# Patient Record
Sex: FEMALE | Race: OTHER | Employment: FULL TIME | URBAN - METROPOLITAN AREA
[De-identification: names, ages, dates, MRNs, and addresses within clinical notes are randomized per-mention and may not be internally consistent; named-entity substitution may affect disease eponyms.]

---

## 2017-02-14 ENCOUNTER — APPOINTMENT (OUTPATIENT)
Dept: LAB | Age: 57
End: 2017-02-14
Attending: INTERNAL MEDICINE
Payer: COMMERCIAL

## 2017-02-14 ENCOUNTER — TELEPHONE (OUTPATIENT)
Dept: NEPHROLOGY | Facility: CLINIC | Age: 57
End: 2017-02-14

## 2017-02-14 DIAGNOSIS — Z94.0 KIDNEY TRANSPLANT RECIPIENT: ICD-10-CM

## 2017-02-14 DIAGNOSIS — E11.9 TYPE 2 DIABETES MELLITUS WITHOUT COMPLICATION, WITHOUT LONG-TERM CURRENT USE OF INSULIN (HCC): ICD-10-CM

## 2017-02-14 DIAGNOSIS — E78.00 PURE HYPERCHOLESTEROLEMIA: ICD-10-CM

## 2017-02-14 DIAGNOSIS — N18.4 CKD (CHRONIC KIDNEY DISEASE), STAGE 4 (SEVERE): Primary | ICD-10-CM

## 2017-02-14 LAB
ALBUMIN SERPL-MCNC: 3.5 G/DL (ref 3.5–4.8)
ALP LIVER SERPL-CCNC: 90 U/L (ref 46–118)
ALT SERPL-CCNC: 20 U/L (ref 14–54)
AST SERPL-CCNC: 21 U/L (ref 15–41)
BILIRUB SERPL-MCNC: 0.5 MG/DL (ref 0.1–2)
BUN BLD-MCNC: 43 MG/DL (ref 8–20)
CALCIUM BLD-MCNC: 8.7 MG/DL (ref 8.3–10.3)
CHLORIDE: 102 MMOL/L (ref 101–111)
CHOLEST SMN-MCNC: 139 MG/DL (ref ?–200)
CO2: 26 MMOL/L (ref 22–32)
CREAT BLD-MCNC: 2.09 MG/DL (ref 0.55–1.02)
CREAT UR-SCNC: 52.4 MG/DL
GLUCOSE BLD-MCNC: 95 MG/DL (ref 70–99)
HDLC SERPL-MCNC: 67 MG/DL (ref 45–?)
HDLC SERPL: 2.07 {RATIO} (ref ?–4.44)
LDLC SERPL CALC-MCNC: 40 MG/DL (ref ?–130)
M PROTEIN MFR SERPL ELPH: 7 G/DL (ref 6.1–8.3)
MICROALBUMIN UR-MCNC: 2.89 MG/DL
MICROALBUMIN/CREAT 24H UR-RTO: 55.2 UG/MG (ref ?–30)
NONHDLC SERPL-MCNC: 72 MG/DL (ref ?–130)
POTASSIUM SERPL-SCNC: 4 MMOL/L (ref 3.6–5.1)
SODIUM SERPL-SCNC: 135 MMOL/L (ref 136–144)
TRIGLYCERIDES: 159 MG/DL (ref ?–150)
VLDL: 32 MG/DL (ref 5–40)

## 2017-02-14 PROCEDURE — 82043 UR ALBUMIN QUANTITATIVE: CPT

## 2017-02-14 PROCEDURE — 36415 COLL VENOUS BLD VENIPUNCTURE: CPT

## 2017-02-14 PROCEDURE — 82570 ASSAY OF URINE CREATININE: CPT

## 2017-02-14 PROCEDURE — 80053 COMPREHEN METABOLIC PANEL: CPT

## 2017-02-14 PROCEDURE — 80061 LIPID PANEL: CPT

## 2017-02-16 NOTE — TELEPHONE ENCOUNTER
Left 2 messages with pt. Asked pt to get labs quarterly- I entered standing order including prograf level.  Current labs / Cr stable- thx fang

## 2017-04-10 ENCOUNTER — LAB ENCOUNTER (OUTPATIENT)
Dept: LAB | Age: 57
End: 2017-04-10
Attending: INTERNAL MEDICINE
Payer: COMMERCIAL

## 2017-04-10 DIAGNOSIS — Z94.0 KIDNEY TRANSPLANT RECIPIENT: ICD-10-CM

## 2017-04-10 DIAGNOSIS — N18.4 CKD (CHRONIC KIDNEY DISEASE), STAGE 4 (SEVERE): ICD-10-CM

## 2017-04-10 PROCEDURE — 80197 ASSAY OF TACROLIMUS: CPT

## 2017-04-10 PROCEDURE — 85025 COMPLETE CBC W/AUTO DIFF WBC: CPT

## 2017-04-10 PROCEDURE — 36415 COLL VENOUS BLD VENIPUNCTURE: CPT

## 2017-04-10 PROCEDURE — 80048 BASIC METABOLIC PNL TOTAL CA: CPT

## 2017-04-13 ENCOUNTER — TELEPHONE (OUTPATIENT)
Dept: NEPHROLOGY | Facility: CLINIC | Age: 57
End: 2017-04-13

## 2017-04-16 NOTE — TELEPHONE ENCOUNTER
Left message for pt- prograf level a bit high but unclear if this is a true 12 hr trough- asked pt to call office back to clarify timing of dose / lab draw. Renal function, etc all stable.

## 2017-04-20 ENCOUNTER — TELEPHONE (OUTPATIENT)
Dept: NEPHROLOGY | Facility: CLINIC | Age: 57
End: 2017-04-20

## 2017-04-21 NOTE — TELEPHONE ENCOUNTER
Left VM with pt- all labs stable; prograf level a bit high (target 5-8) but this may not be a true trough- asked pt to repeat next month- martin ramirez

## 2017-05-05 PROBLEM — M62.81 MUSCLE WEAKNESS: Status: ACTIVE | Noted: 2017-05-05

## 2017-05-05 PROBLEM — N39.41 URGE URINARY INCONTINENCE: Status: ACTIVE | Noted: 2017-05-05

## 2017-05-05 PROCEDURE — 87015 SPECIMEN INFECT AGNT CONCNTJ: CPT | Performed by: INTERNAL MEDICINE

## 2017-05-05 PROCEDURE — 87045 FECES CULTURE AEROBIC BACT: CPT | Performed by: INTERNAL MEDICINE

## 2017-05-05 PROCEDURE — 87427 SHIGA-LIKE TOXIN AG IA: CPT | Performed by: INTERNAL MEDICINE

## 2017-05-05 PROCEDURE — 87046 STOOL CULTR AEROBIC BACT EA: CPT | Performed by: INTERNAL MEDICINE

## 2017-05-05 PROCEDURE — 87493 C DIFF AMPLIFIED PROBE: CPT | Performed by: INTERNAL MEDICINE

## 2017-10-06 ENCOUNTER — LAB ENCOUNTER (OUTPATIENT)
Dept: LAB | Age: 57
End: 2017-10-06
Attending: INTERNAL MEDICINE
Payer: COMMERCIAL

## 2017-10-06 ENCOUNTER — TELEPHONE (OUTPATIENT)
Dept: NEPHROLOGY | Facility: CLINIC | Age: 57
End: 2017-10-06

## 2017-10-06 DIAGNOSIS — Z94.0 KIDNEY TRANSPLANT RECIPIENT: ICD-10-CM

## 2017-10-06 DIAGNOSIS — N18.4 STAGE 4 CHRONIC KIDNEY DISEASE (HCC): ICD-10-CM

## 2017-10-06 PROCEDURE — 85025 COMPLETE CBC W/AUTO DIFF WBC: CPT

## 2017-10-06 PROCEDURE — 36415 COLL VENOUS BLD VENIPUNCTURE: CPT

## 2017-10-06 PROCEDURE — 80197 ASSAY OF TACROLIMUS: CPT

## 2017-10-06 PROCEDURE — 80048 BASIC METABOLIC PNL TOTAL CA: CPT

## 2017-10-07 NOTE — TELEPHONE ENCOUNTER
Left VM for pt- all labs stable; prograf level pending; asked pt to increase frequency of lab draws to at least q3 months- martin ramirez

## 2017-10-09 ENCOUNTER — TELEPHONE (OUTPATIENT)
Dept: NEPHROLOGY | Facility: CLINIC | Age: 57
End: 2017-10-09

## 2017-10-10 ENCOUNTER — TELEPHONE (OUTPATIENT)
Dept: NEPHROLOGY | Facility: CLINIC | Age: 57
End: 2017-10-10

## 2017-10-10 NOTE — TELEPHONE ENCOUNTER
Left VM x 2- renal function / routine labs stable; prograf level therapeutic; asked pt to have labs drawn q3 months- martin ramirez

## 2017-10-11 NOTE — TELEPHONE ENCOUNTER
Left VM x 3 with pt- all labs stable- asked pt to increase lab draws to at least q3 months- martin ramirez

## 2017-12-28 PROBLEM — E11.9 TYPE 2 DIABETES MELLITUS WITHOUT COMPLICATION, WITHOUT LONG-TERM CURRENT USE OF INSULIN (HCC): Status: ACTIVE | Noted: 2017-12-28

## 2018-01-19 ENCOUNTER — LAB ENCOUNTER (OUTPATIENT)
Dept: LAB | Age: 58
End: 2018-01-19
Attending: INTERNAL MEDICINE
Payer: COMMERCIAL

## 2018-01-19 ENCOUNTER — OFFICE VISIT (OUTPATIENT)
Dept: NEPHROLOGY | Facility: CLINIC | Age: 58
End: 2018-01-19

## 2018-01-19 DIAGNOSIS — E11.9 DIABETES MELLITUS TYPE 2 IN NONOBESE (HCC): ICD-10-CM

## 2018-01-19 DIAGNOSIS — Z94.0 KIDNEY TRANSPLANT RECIPIENT: Primary | ICD-10-CM

## 2018-01-19 DIAGNOSIS — N18.4 STAGE 4 CHRONIC KIDNEY DISEASE (HCC): ICD-10-CM

## 2018-01-19 DIAGNOSIS — Z94.0 KIDNEY TRANSPLANT RECIPIENT: ICD-10-CM

## 2018-01-19 LAB
BASOPHILS # BLD AUTO: 0.03 X10(3) UL (ref 0–0.1)
BASOPHILS NFR BLD AUTO: 1.2 %
BUN BLD-MCNC: 32 MG/DL (ref 8–20)
CALCIUM BLD-MCNC: 9.2 MG/DL (ref 8.3–10.3)
CHLORIDE: 107 MMOL/L (ref 101–111)
CO2: 27 MMOL/L (ref 22–32)
CREAT BLD-MCNC: 2.34 MG/DL (ref 0.55–1.02)
EOSINOPHIL # BLD AUTO: 0.06 X10(3) UL (ref 0–0.3)
EOSINOPHIL NFR BLD AUTO: 2.3 %
ERYTHROCYTE [DISTWIDTH] IN BLOOD BY AUTOMATED COUNT: 14.3 % (ref 11.5–16)
GLUCOSE BLD-MCNC: 200 MG/DL (ref 70–99)
HCT VFR BLD AUTO: 34.7 % (ref 34–50)
HGB BLD-MCNC: 11.8 G/DL (ref 12–16)
IMMATURE GRANULOCYTE COUNT: 0.01 X10(3) UL (ref 0–1)
IMMATURE GRANULOCYTE RATIO %: 0.4 %
LYMPHOCYTES # BLD AUTO: 1.1 X10(3) UL (ref 0.9–4)
LYMPHOCYTES NFR BLD AUTO: 42.5 %
MCH RBC QN AUTO: 33.9 PG (ref 27–33.2)
MCHC RBC AUTO-ENTMCNC: 34 G/DL (ref 31–37)
MCV RBC AUTO: 99.7 FL (ref 81–100)
MONOCYTES # BLD AUTO: 0.36 X10(3) UL (ref 0.1–0.6)
MONOCYTES NFR BLD AUTO: 13.9 %
NEUTROPHIL ABS PRELIM: 1.03 X10 (3) UL (ref 1.3–6.7)
NEUTROPHILS # BLD AUTO: 1.03 X10(3) UL (ref 1.3–6.7)
NEUTROPHILS NFR BLD AUTO: 39.7 %
PLATELET # BLD AUTO: 319 10(3)UL (ref 150–450)
POTASSIUM SERPL-SCNC: 4.2 MMOL/L (ref 3.6–5.1)
RBC # BLD AUTO: 3.48 X10(6)UL (ref 3.8–5.1)
RED CELL DISTRIBUTION WIDTH-SD: 51.9 FL (ref 35.1–46.3)
SODIUM SERPL-SCNC: 141 MMOL/L (ref 136–144)
WBC # BLD AUTO: 2.6 X10(3) UL (ref 4–13)

## 2018-01-19 PROCEDURE — 80197 ASSAY OF TACROLIMUS: CPT

## 2018-01-19 PROCEDURE — 36415 COLL VENOUS BLD VENIPUNCTURE: CPT

## 2018-01-19 PROCEDURE — 80048 BASIC METABOLIC PNL TOTAL CA: CPT

## 2018-01-19 PROCEDURE — 99214 OFFICE O/P EST MOD 30 MIN: CPT | Performed by: INTERNAL MEDICINE

## 2018-01-19 PROCEDURE — 85025 COMPLETE CBC W/AUTO DIFF WBC: CPT

## 2018-01-19 NOTE — PROGRESS NOTES
Nephrology Progress Note      ASSESSMENT/PLAN:        1) CKD 4 s/p renal transplant approx 15 yrs ago (SLE)- doing very well with stable Cr approx 2.2 mg/dl over the last 5 years; this is plateaued since she developed acute rejection after steroid withdraw prednisone   • HYPERTENSION    • Kidney replaced by transplant [id number]    DR. BANKS([place]),   • Lupus erythematosus    • Ovarian cyst    • PONV (postoperative nausea and vomiting)    • RENAL DISEASE     kidney transplant      Past Surgical Histo Solution Pen-injector INJECT 20 UNITS SUBCUTANEOUSLY ONCE DAILY Disp: 15 mL Rfl: 0   azathioprine 50 MG Oral Tab Take 1 tablet (50 mg total) by mouth once daily.  Disp: 90 tablet Rfl: 3   tacrolimus 1 MG Oral Cap TAKE 2 CAPSULES IN THE MORNING AND IN THE EV

## 2018-01-20 LAB — TACROLIMUS: 10.1 NG/ML

## 2018-02-21 PROBLEM — Z94.0 RENAL TRANSPLANT RECIPIENT (HCC): Status: ACTIVE | Noted: 2018-02-21

## 2018-02-21 PROBLEM — N18.4 CHRONIC KIDNEY DISEASE (CKD), STAGE IV (SEVERE) (HCC): Status: ACTIVE | Noted: 2018-02-21

## 2018-02-21 PROBLEM — Z94.0 RENAL TRANSPLANT RECIPIENT: Status: ACTIVE | Noted: 2018-02-21

## 2018-03-07 PROBLEM — M62.81 MUSCLE WEAKNESS: Status: RESOLVED | Noted: 2017-05-05 | Resolved: 2018-03-07

## 2018-03-07 PROBLEM — E11.9 TYPE 2 DIABETES MELLITUS WITHOUT COMPLICATION, WITHOUT LONG-TERM CURRENT USE OF INSULIN (HCC): Status: RESOLVED | Noted: 2017-12-28 | Resolved: 2018-03-07

## 2018-03-07 PROCEDURE — 88175 CYTOPATH C/V AUTO FLUID REDO: CPT | Performed by: INTERNAL MEDICINE

## 2018-03-07 PROCEDURE — 87625 HPV TYPES 16 & 18 ONLY: CPT | Performed by: INTERNAL MEDICINE

## 2018-03-07 PROCEDURE — 87624 HPV HI-RISK TYP POOLED RSLT: CPT | Performed by: INTERNAL MEDICINE

## 2018-05-31 ENCOUNTER — HOSPITAL ENCOUNTER (OUTPATIENT)
Facility: HOSPITAL | Age: 58
Setting detail: OBSERVATION
LOS: 1 days | Discharge: HOME | End: 2018-06-01
Attending: INTERNAL MEDICINE | Admitting: INTERNAL MEDICINE
Payer: MEDICAID

## 2018-05-31 DIAGNOSIS — D68.9 COAGULOPATHY (HCC): ICD-10-CM

## 2018-05-31 DIAGNOSIS — I10 ESSENTIAL HYPERTENSION: ICD-10-CM

## 2018-05-31 DIAGNOSIS — Z95.5 S/P CORONARY ARTERY STENT PLACEMENT: Primary | ICD-10-CM

## 2018-05-31 DIAGNOSIS — I48.0 PAROXYSMAL ATRIAL FIBRILLATION (HCC): ICD-10-CM

## 2018-05-31 DIAGNOSIS — N18.4 CHRONIC KIDNEY DISEASE (CKD), STAGE IV (SEVERE) (HCC): ICD-10-CM

## 2018-05-31 DIAGNOSIS — I25.10 ATHEROSCLEROSIS OF NATIVE CORONARY ARTERY OF NATIVE HEART WITHOUT ANGINA PECTORIS: ICD-10-CM

## 2018-05-31 DIAGNOSIS — E78.00 PURE HYPERCHOLESTEROLEMIA: ICD-10-CM

## 2018-05-31 DIAGNOSIS — Z79.01 LONG TERM (CURRENT) USE OF ANTICOAGULANTS: ICD-10-CM

## 2018-05-31 DIAGNOSIS — E11.9 TYPE 2 DIABETES MELLITUS WITHOUT COMPLICATION, UNSPECIFIED WHETHER LONG TERM INSULIN USE (HCC): ICD-10-CM

## 2018-05-31 PROCEDURE — 93010 ELECTROCARDIOGRAM REPORT: CPT | Performed by: INTERNAL MEDICINE

## 2018-05-31 PROCEDURE — 86920 COMPATIBILITY TEST SPIN: CPT

## 2018-05-31 PROCEDURE — 83036 HEMOGLOBIN GLYCOSYLATED A1C: CPT | Performed by: INTERNAL MEDICINE

## 2018-05-31 PROCEDURE — 85610 PROTHROMBIN TIME: CPT | Performed by: INTERNAL MEDICINE

## 2018-05-31 PROCEDURE — 84484 ASSAY OF TROPONIN QUANT: CPT | Performed by: INTERNAL MEDICINE

## 2018-05-31 PROCEDURE — 82962 GLUCOSE BLOOD TEST: CPT

## 2018-05-31 PROCEDURE — 86901 BLOOD TYPING SEROLOGIC RH(D): CPT | Performed by: INTERNAL MEDICINE

## 2018-05-31 PROCEDURE — 86900 BLOOD TYPING SEROLOGIC ABO: CPT | Performed by: INTERNAL MEDICINE

## 2018-05-31 PROCEDURE — 82550 ASSAY OF CK (CPK): CPT | Performed by: INTERNAL MEDICINE

## 2018-05-31 PROCEDURE — 86850 RBC ANTIBODY SCREEN: CPT | Performed by: INTERNAL MEDICINE

## 2018-05-31 PROCEDURE — 93005 ELECTROCARDIOGRAM TRACING: CPT

## 2018-05-31 PROCEDURE — 85018 HEMOGLOBIN: CPT | Performed by: INTERNAL MEDICINE

## 2018-05-31 RX ORDER — HYDROCODONE BITARTRATE AND ACETAMINOPHEN 5; 325 MG/1; MG/1
1-2 TABLET ORAL EVERY 4 HOURS PRN
Status: DISCONTINUED | OUTPATIENT
Start: 2018-05-31 | End: 2018-06-01

## 2018-05-31 RX ORDER — DEXTROSE MONOHYDRATE 25 G/50ML
50 INJECTION, SOLUTION INTRAVENOUS
Status: DISCONTINUED | OUTPATIENT
Start: 2018-05-31 | End: 2018-06-01

## 2018-05-31 RX ORDER — ATORVASTATIN CALCIUM 10 MG/1
10 TABLET, FILM COATED ORAL NIGHTLY
Status: DISCONTINUED | OUTPATIENT
Start: 2018-05-31 | End: 2018-06-01

## 2018-05-31 RX ORDER — PREDNISONE 1 MG/1
5 TABLET ORAL
Status: DISCONTINUED | OUTPATIENT
Start: 2018-05-31 | End: 2018-06-01

## 2018-05-31 RX ORDER — TACROLIMUS 1 MG/1
2 CAPSULE ORAL 2 TIMES DAILY
Status: DISCONTINUED | OUTPATIENT
Start: 2018-05-31 | End: 2018-06-01

## 2018-05-31 RX ORDER — HYDROCODONE BITARTRATE AND ACETAMINOPHEN 5; 325 MG/1; MG/1
1-2 TABLET ORAL EVERY 4 HOURS PRN
COMMUNITY
End: 2018-06-12 | Stop reason: ALTCHOICE

## 2018-05-31 RX ORDER — AZATHIOPRINE 50 MG/1
50 TABLET ORAL DAILY
Status: DISCONTINUED | OUTPATIENT
Start: 2018-05-31 | End: 2018-06-01

## 2018-05-31 RX ORDER — SODIUM CHLORIDE 9 MG/ML
INJECTION, SOLUTION INTRAVENOUS ONCE
Status: COMPLETED | OUTPATIENT
Start: 2018-05-31 | End: 2018-06-01

## 2018-05-31 RX ORDER — ACETAMINOPHEN 325 MG/1
650 TABLET ORAL EVERY 6 HOURS PRN
Status: DISCONTINUED | OUTPATIENT
Start: 2018-05-31 | End: 2018-06-01

## 2018-05-31 RX ORDER — AMLODIPINE BESYLATE 5 MG/1
10 TABLET ORAL DAILY
Status: DISCONTINUED | OUTPATIENT
Start: 2018-05-31 | End: 2018-06-01

## 2018-05-31 RX ORDER — ACETAMINOPHEN 325 MG/1
650 TABLET ORAL ONCE
Status: COMPLETED | OUTPATIENT
Start: 2018-05-31 | End: 2018-05-31

## 2018-05-31 NOTE — PROGRESS NOTES
NURSING ADMISSION NOTE      Patient admitted via Ambulance  Oriented to room. Safety precautions initiated. Bed in low position. Call light in reach. Direct admit from [facility] Patient c/o moderate pain on oral incision.  Patient's vital

## 2018-05-31 NOTE — PLAN OF CARE
Patient c/o mild dizziness, BP 25'U-088'E systolic. No active bleeding noted from oral incision. Tele shows sinus rhythm/ sinus tachycardia. Dr. Ballesteros Dose notified regarding elevated troponin level and hemoglobin 5.0.

## 2018-05-31 NOTE — CONSULTS
[facility] [address] [place] [address] Cardiology  Report of Consultation    Viviana Norman Patient Status:  Inpatient    10/2/1960 MRN [id number]   [facility] 4NW-A Attending Gloria Marti MD   Hosp Day # 0 PCP Teofilo Pierre MD     Reason for C LAD-Bo  1/12/10: ANGIOPLASTY (CORONARY)      Comment: Xience Stent to Ramus-Edward  No date: AV FISTULA REVISION, OPEN  10/26/2015: COLONOSCOPY,DIAGNOSTIC N/A      Comment: Procedure: COLONOSCOPY, POSSIBLE BIOPSY,                POSSIBLE POLYPECTOMY 45 Rfl: 3   TACROLIMUS 1 MG Oral Cap TAKE 2 CAPSULES IN THE MORNING AND EVENING Disp: 360 capsule Rfl: 1   simvastatin 20 MG Oral Tab TAKE 1 TABLET NIGHTLY Disp: 90 tablet Rfl: 4   Insulin Pen Needle (BD PEN NEEDLE MINI U/F) 31G X 5 MM Does not apply Misc bid impulse. Lungs: Clear to ascultation bilaterally. No focal rales, rhonchi, or wheezes. Good air movement is noted throughout all lung fields. Abdomen: Soft. Non-distended. Non-tender. Bowel sounds are present and normoactive.   No guarding or rebound       Gwendolyn Ellison MD  5/31/2018  6:27 PM

## 2018-06-01 ENCOUNTER — APPOINTMENT (OUTPATIENT)
Dept: CV DIAGNOSTICS | Facility: HOSPITAL | Age: 58
End: 2018-06-01
Attending: INTERNAL MEDICINE
Payer: MEDICAID

## 2018-06-01 VITALS
WEIGHT: 149.88 LBS | HEART RATE: 94 BPM | RESPIRATION RATE: 16 BRPM | BODY MASS INDEX: 29.42 KG/M2 | SYSTOLIC BLOOD PRESSURE: 148 MMHG | OXYGEN SATURATION: 100 % | TEMPERATURE: 98 F | HEIGHT: 60 IN | DIASTOLIC BLOOD PRESSURE: 71 MMHG

## 2018-06-01 PROBLEM — D68.9 COAGULOPATHY (HCC): Status: ACTIVE | Noted: 2018-06-01

## 2018-06-01 PROCEDURE — 30233N1 TRANSFUSION OF NONAUTOLOGOUS RED BLOOD CELLS INTO PERIPHERAL VEIN, PERCUTANEOUS APPROACH: ICD-10-PCS | Performed by: INTERNAL MEDICINE

## 2018-06-01 PROCEDURE — 85610 PROTHROMBIN TIME: CPT | Performed by: INTERNAL MEDICINE

## 2018-06-01 PROCEDURE — 80048 BASIC METABOLIC PNL TOTAL CA: CPT | Performed by: NURSE PRACTITIONER

## 2018-06-01 PROCEDURE — 84484 ASSAY OF TROPONIN QUANT: CPT | Performed by: INTERNAL MEDICINE

## 2018-06-01 PROCEDURE — 97116 GAIT TRAINING THERAPY: CPT

## 2018-06-01 PROCEDURE — 93306 TTE W/DOPPLER COMPLETE: CPT | Performed by: INTERNAL MEDICINE

## 2018-06-01 PROCEDURE — 36430 TRANSFUSION BLD/BLD COMPNT: CPT

## 2018-06-01 PROCEDURE — 97161 PT EVAL LOW COMPLEX 20 MIN: CPT

## 2018-06-01 PROCEDURE — 82962 GLUCOSE BLOOD TEST: CPT

## 2018-06-01 PROCEDURE — 82550 ASSAY OF CK (CPK): CPT | Performed by: INTERNAL MEDICINE

## 2018-06-01 PROCEDURE — 85025 COMPLETE CBC W/AUTO DIFF WBC: CPT | Performed by: NURSE PRACTITIONER

## 2018-06-01 PROCEDURE — 85025 COMPLETE CBC W/AUTO DIFF WBC: CPT | Performed by: INTERNAL MEDICINE

## 2018-06-01 RX ORDER — WARFARIN SODIUM 2.5 MG/1
TABLET ORAL
Qty: 215 TABLET | Refills: 3 | Status: SHIPPED | OUTPATIENT
Start: 2018-06-01 | End: 2018-06-01

## 2018-06-01 RX ORDER — WARFARIN SODIUM 2.5 MG/1
TABLET ORAL
Qty: 215 TABLET | Refills: 3 | Status: SHIPPED | OUTPATIENT
Start: 2018-06-11 | End: 2018-07-24

## 2018-06-01 RX ORDER — WARFARIN SODIUM 2.5 MG/1
2.5 TABLET ORAL NIGHTLY
Qty: 30 TABLET | Refills: 0 | Status: SHIPPED | OUTPATIENT
Start: 2018-06-01 | End: 2018-06-01

## 2018-06-01 NOTE — PROGRESS NOTES
Patient completed 2 units of packed cells this shift. Tolerated without issue. Fall precautions observed. No complaint of pain. Awake at this time,family at bedside.

## 2018-06-01 NOTE — PHYSICAL THERAPY NOTE
PHYSICAL THERAPY EVALUATION - INPATIENT     Room Number: 405/405-A  Evaluation Date: 6/1/2018  Type of Evaluation: Initial  Physician Order: PT Eval and Treat    Presenting Problem: Anemia/syncope  Reason for Therapy: Mobility Dysfunction and Dischar Spouse  Drives: Yes  Patient Owned Equipment: Other (Comment) (comfort ht toilet)  Patient Regularly Uses: Glasses    Prior Level of [place]: Pt is indep c all ADLs/IADLs and amb sans AD; pt recently got laid off with [employer] closing and not currdb wheelchair)?: A Little   -   Need to walk in hospital room?: A Little   -   Climbing 3-5 steps with a railing?: A Little       AM-PAC Score:  Raw Score: 18   PT Approx Degree of Impairment Score: 46.58%   Standardized Score (AM-PAC Scale): 43.63   CMS Livan and overall the evaluation complexity is considered low. These impairments and comorbidities manifest themselves as functional limitations in independent bed mobility, transfers, gait and stairs.   The patient is below baseline and would benefit from skill

## 2018-06-01 NOTE — PROGRESS NOTES
Assumed care at 1900. Patient has order for 2 units of PRBC. Blood returned to blood bank by previous RN because patient was febrile. Tylenol given by previous staff. At 10pm temp was 100.1,ice packs applied. At 2000 temp was 99. 5. [address] [place] [address] Rd notified. Order giv

## 2018-06-01 NOTE — PROGRESS NOTES
[facility] Cardiology Progress Note    Rory Para Patient Status:  Inpatient    10/2/1960 MRN [id number]   [facility] 4NW-A Attending Terra Means MD   Hosp Day # 1 PCP Bernadette Dorado MD     Subjective: Patient Bowel sounds are present and normoactive. No guarding or rebound. Extremities: Extremities do not demonstrate any evidence of peripheral edema. No cyanosis or clubbing of the digits is appreciated.   Femoral, Dorsalis Pedis, and Posterior Tibialis  pul mellitus. 9. Systemic lupus erythematosus.    10. Renal failure status post renal transplantation with ongoing renal insufficiency and immunosuppressive therapy and resultant RI.  11.  +Troponin    -Trop now trended down to normal, suspect demand ischemia

## 2018-06-01 NOTE — H&P
[address] [place] Patient Status:  Observation    10/2/1960 MRN [id number]   [facility] 4NW-A Attending Jesús Noyola MD   Hosp Day # 1 PCP Latia Dumont MD     Active Problems:    Coagulopathy [facility])      Cleopatra Messina heard.  Pulmonary/Chest: Effort normal and breath sounds normal. No respiratory distress. She has no wheezes. She has no rales. She exhibits no tenderness. Abdominal: Soft. Bowel sounds are normal. She exhibits no distension and no mass.  There is no tend

## 2018-06-01 NOTE — PAYOR COMM NOTE
--------------  ADMISSION REVIEW     Payor: [address] [place] [address]  Subscriber #:  [id number]  Authorization Number: N/A    Admit date: 5/31/18  Admit time: 56       Admitting Physician: Dequan Madison MD  Attending Physician:     • PONV (postoperative nausea and vomiting)     • RENAL DISEASE       kidney transplant       Past Surgical History:  11/16/09: ANGIOPLASTY (CORONARY)      Comment: Xience Stent to LAD-Edward  1/12/10: ANGIOPLASTY (CORONARY)      Comment: Xience Stent t [facility] once demonstrates reasonable Hb         MEDICATIONS ADMINISTERED IN LAST 1 DAY:  acetaminophen (TYLENOL) tab 650 mg     Date Action Dose Route User    5/31/2018 1906 Given 650 mg Oral Sudeep Reyes, RN      AmLODIPine Besylate ([address]) t

## 2018-06-03 ENCOUNTER — HOSPITAL ENCOUNTER (INPATIENT)
Facility: HOSPITAL | Age: 58
LOS: 1 days | Discharge: HOME | DRG: 378 | End: 2018-06-04
Admitting: INTERNAL MEDICINE
Payer: MEDICAID

## 2018-06-03 ENCOUNTER — APPOINTMENT (OUTPATIENT)
Dept: CT IMAGING | Facility: HOSPITAL | Age: 58
DRG: 378 | End: 2018-06-03
Payer: MEDICAID

## 2018-06-03 DIAGNOSIS — K92.2 UPPER GI BLEED: Primary | ICD-10-CM

## 2018-06-03 DIAGNOSIS — Z94.0 RENAL TRANSPLANT RECIPIENT: ICD-10-CM

## 2018-06-03 PROCEDURE — 81003 URINALYSIS AUTO W/O SCOPE: CPT

## 2018-06-03 PROCEDURE — 96375 TX/PRO/DX INJ NEW DRUG ADDON: CPT

## 2018-06-03 PROCEDURE — 85730 THROMBOPLASTIN TIME PARTIAL: CPT

## 2018-06-03 PROCEDURE — 85610 PROTHROMBIN TIME: CPT

## 2018-06-03 PROCEDURE — 99285 EMERGENCY DEPT VISIT HI MDM: CPT

## 2018-06-03 PROCEDURE — 85025 COMPLETE CBC W/AUTO DIFF WBC: CPT

## 2018-06-03 PROCEDURE — 80053 COMPREHEN METABOLIC PANEL: CPT

## 2018-06-03 PROCEDURE — 96374 THER/PROPH/DIAG INJ IV PUSH: CPT

## 2018-06-03 PROCEDURE — C9113 INJ PANTOPRAZOLE SODIUM, VIA: HCPCS

## 2018-06-03 PROCEDURE — 74176 CT ABD & PELVIS W/O CONTRAST: CPT

## 2018-06-03 PROCEDURE — 83690 ASSAY OF LIPASE: CPT

## 2018-06-03 PROCEDURE — 96361 HYDRATE IV INFUSION ADD-ON: CPT

## 2018-06-03 RX ORDER — ONDANSETRON 2 MG/ML
4 INJECTION INTRAMUSCULAR; INTRAVENOUS ONCE
Status: COMPLETED | OUTPATIENT
Start: 2018-06-03 | End: 2018-06-03

## 2018-06-03 RX ORDER — HYDROMORPHONE HYDROCHLORIDE 1 MG/ML
0.5 INJECTION, SOLUTION INTRAMUSCULAR; INTRAVENOUS; SUBCUTANEOUS ONCE
Status: COMPLETED | OUTPATIENT
Start: 2018-06-03 | End: 2018-06-03

## 2018-06-03 RX ORDER — SODIUM CHLORIDE 9 MG/ML
INJECTION, SOLUTION INTRAVENOUS CONTINUOUS
Status: DISCONTINUED | OUTPATIENT
Start: 2018-06-03 | End: 2018-06-04

## 2018-06-04 ENCOUNTER — SURGERY (OUTPATIENT)
Age: 58
End: 2018-06-04

## 2018-06-04 VITALS
TEMPERATURE: 99 F | HEIGHT: 60 IN | OXYGEN SATURATION: 94 % | BODY MASS INDEX: 28.47 KG/M2 | SYSTOLIC BLOOD PRESSURE: 106 MMHG | WEIGHT: 145 LBS | HEART RATE: 59 BPM | RESPIRATION RATE: 16 BRPM | DIASTOLIC BLOOD PRESSURE: 59 MMHG

## 2018-06-04 PROCEDURE — 80048 BASIC METABOLIC PNL TOTAL CA: CPT | Performed by: INTERNAL MEDICINE

## 2018-06-04 PROCEDURE — 99152 MOD SED SAME PHYS/QHP 5/>YRS: CPT | Performed by: INTERNAL MEDICINE

## 2018-06-04 PROCEDURE — 0DJ08ZZ INSPECTION OF UPPER INTESTINAL TRACT, VIA NATURAL OR ARTIFICIAL OPENING ENDOSCOPIC: ICD-10-PCS | Performed by: INTERNAL MEDICINE

## 2018-06-04 PROCEDURE — 84132 ASSAY OF SERUM POTASSIUM: CPT | Performed by: INTERNAL MEDICINE

## 2018-06-04 PROCEDURE — 82962 GLUCOSE BLOOD TEST: CPT

## 2018-06-04 PROCEDURE — 85025 COMPLETE CBC W/AUTO DIFF WBC: CPT | Performed by: INTERNAL MEDICINE

## 2018-06-04 RX ORDER — POTASSIUM CHLORIDE 14.9 MG/ML
20 INJECTION INTRAVENOUS ONCE
Status: DISCONTINUED | OUTPATIENT
Start: 2018-06-04 | End: 2018-06-04

## 2018-06-04 RX ORDER — SODIUM CHLORIDE 9 MG/ML
INJECTION, SOLUTION INTRAVENOUS CONTINUOUS
Status: DISCONTINUED | OUTPATIENT
Start: 2018-06-04 | End: 2018-06-04

## 2018-06-04 RX ORDER — PREDNISONE 1 MG/1
5 TABLET ORAL
Status: DISCONTINUED | OUTPATIENT
Start: 2018-06-04 | End: 2018-06-04

## 2018-06-04 RX ORDER — ONDANSETRON 2 MG/ML
4 INJECTION INTRAMUSCULAR; INTRAVENOUS EVERY 4 HOURS PRN
Status: DISCONTINUED | OUTPATIENT
Start: 2018-06-04 | End: 2018-06-04

## 2018-06-04 RX ORDER — AZATHIOPRINE 50 MG/1
50 TABLET ORAL
Status: DISCONTINUED | OUTPATIENT
Start: 2018-06-04 | End: 2018-06-04

## 2018-06-04 RX ORDER — PANTOPRAZOLE SODIUM 40 MG/1
40 TABLET, DELAYED RELEASE ORAL
Status: DISCONTINUED | OUTPATIENT
Start: 2018-06-05 | End: 2018-06-04

## 2018-06-04 RX ORDER — SODIUM CHLORIDE 9 MG/ML
INJECTION, SOLUTION INTRAVENOUS CONTINUOUS
Status: ACTIVE | OUTPATIENT
Start: 2018-06-04 | End: 2018-06-04

## 2018-06-04 RX ORDER — PANTOPRAZOLE SODIUM 40 MG/1
40 TABLET, DELAYED RELEASE ORAL
Qty: 30 TABLET | Refills: 0 | Status: SHIPPED | OUTPATIENT
Start: 2018-06-05 | End: 2018-11-08 | Stop reason: ALTCHOICE

## 2018-06-04 RX ORDER — POTASSIUM CHLORIDE 14.9 MG/ML
20 INJECTION INTRAVENOUS ONCE
Status: COMPLETED | OUTPATIENT
Start: 2018-06-04 | End: 2018-06-04

## 2018-06-04 RX ORDER — ATORVASTATIN CALCIUM 10 MG/1
10 TABLET, FILM COATED ORAL NIGHTLY
Status: DISCONTINUED | OUTPATIENT
Start: 2018-06-04 | End: 2018-06-04

## 2018-06-04 RX ORDER — HYDROMORPHONE HYDROCHLORIDE 1 MG/ML
0.5 INJECTION, SOLUTION INTRAMUSCULAR; INTRAVENOUS; SUBCUTANEOUS EVERY 4 HOURS PRN
Status: DISCONTINUED | OUTPATIENT
Start: 2018-06-04 | End: 2018-06-04

## 2018-06-04 RX ORDER — PANTOPRAZOLE SODIUM 40 MG/1
40 TABLET, DELAYED RELEASE ORAL
Qty: 30 TABLET | Refills: 0 | Status: SHIPPED | OUTPATIENT
Start: 2018-06-05 | End: 2018-06-04

## 2018-06-04 RX ORDER — MIDAZOLAM HYDROCHLORIDE 1 MG/ML
INJECTION INTRAMUSCULAR; INTRAVENOUS
Status: DISCONTINUED | OUTPATIENT
Start: 2018-06-04 | End: 2018-06-04 | Stop reason: HOSPADM

## 2018-06-04 RX ORDER — DEXTROSE MONOHYDRATE 25 G/50ML
50 INJECTION, SOLUTION INTRAVENOUS
Status: DISCONTINUED | OUTPATIENT
Start: 2018-06-04 | End: 2018-06-04

## 2018-06-04 NOTE — ED INITIAL ASSESSMENT (HPI)
Patient c/o abd pain started Friday. Generalized abd pain. Denies n/v/d/c. Patient sts was discharged Friday from hospital- admitted for bleeding/anemia.

## 2018-06-04 NOTE — CONSULTS
[facility]    Report of Consultation    Estiven Finn Patient Status:  Inpatient    10/2/1960 MRN [id number]   [facility] 4NW-A Attending Nilam Sawyer MD   Hosp Day # 0 PCP Luis Guzman MD     Date of Admission:  6/3/2018 alcohol. She reports that she does not use drugs.     Allergies:    Tetracycline Base       RASH    Medications:    Current Facility-Administered Medications:   •  0.9%  NaCl infusion, , Intravenous, Continuous  •  Pantoprazole Sodium (PROTONIX) 40 mg in So gain, sleep disturbance. Neurological: Denies frequent headaches, history of stroke, recent passing out, recent dizziness, convulsions/seizures, dementia.   Cardiovascular: Denies history of heart murmur, leg swelling, history of rheumatic fever, pacemaker breastfeeding. General: Appears alert, oriented x3 and in no acute distress. HEENT: Normal. No neck vein distention. Thyroid not enlarged. No lymphadenopathy. CV: S1 and S2 normal.  No murmurs or gallops. Lungs: Clear to auscultation.   Abdomen: Soft Albino Mora  6/4/2018  12:49 PM

## 2018-06-04 NOTE — PAYOR COMM NOTE
--------------  ADMISSION REVIEW     Payor: Franck Aranda #:  [id number]  Authorization Number: [id number]    Admit date: 6/4/18  Admit time: [address]       Admitting Physician: Jeri Thomas MD  Attending Physic History:  11/16/09: ANGIOPLASTY (CORONARY)      Comment: Xience Stent to LAD-Edward  1/12/10: ANGIOPLASTY (CORONARY)      Comment: Xience Stent to Ramus-Edward  No date: AV FISTULA REVISION, OPEN  10/26/2015: COLONOSCOPY,DIAGNOSTIC N/A      Comment: Proced positive black stool. Old well-healed scars noted. Back: No costovertebral angle tenderness.      Extremities: Warm, well perfused, without edema    No significant deformity or joint abnormality    Calves are symmetric and nontender  Good peripheral col CBC W/ DIFFERENTIAL[[id number]]          Abnormal            Final result                 Please view results for these tests on the individual orders. CBC WITH DIFFERENTIAL WITH PLATELET    Narrative:      The following orders were created for panel AORTA/VASCULAR:  No aneurysm. RETROPERITONEUM:  No mass or adenopathy. BOWEL/MESENTERY:  No visible mass, obstruction, or bowel wall thickening. ABDOMINAL WALL:  No mass or hernia.   URINARY BLADDER:  No visible focal wall thickening, lesion, or calculus transplant recipient Z94.0 2/21/2018               Signed by Charlotte Hernandez MD on 6/4/2018  5:58 AM            H&P - H&P Note      H&P signed by Florencio Lee MD at 6/4/2018  9:56 AM     Author:  Florencio Lee MD Service:  Internal Medicine Author Comment: Xience Stent to LAD-Edward  1/12/10: ANGIOPLASTY (CORONARY)      Comment: Xience Stent to Ramus-Edward  No date: AV FISTULA REVISION, OPEN  10/26/2015: COLONOSCOPY,DIAGNOSTIC N/A      Comment: Procedure: COLONOSCOPY, POSSIBLE BIOPSY, Lungs clear bilaterally, good inspiratory effort   CV:  nL S1/S2, RRR  Abd: soft, + mild tenderness in epigastric region, ND, no hepatomegaly, +BS  MSK: moving all extremities, no edema  Neuro: no focal deficits  Skin: no rashes/lesions  Psych: normal mood of the native kidneys with a large exophytic right inferior pole renal cyst measuring 3.5 x 3.4 x 3.1 cm. There is a transplanted kidney within the right hemipelvis which appears unremarkable. ADRENALS:  No mass or enlargement.   AORTA/VASCULAR:  No aneurys tacrolimus (2mg BID)  - Continue home prednisone  - Creatinine at baseline (around 2.2)  - monitor, avoid nephrotoxic agents    # CAD  - Continue beta blocker and statin    # HTN  - Continue Beta blocker  - Hold amlodipine this AM, resume pending BP    # D 0.9 % 10 mL IV push     Date Action Dose Route User    Discharged on 6/4/2018    6/3/2018 2326 Given 40 mg Intravenous Brenna Woodward, RN      potassium chloride IVPB premix 20 mEq     Date Action Dose Route User    Discharged on 6/4/2018 6/4/2018 and was then withdrawn to examine the duodenal bulb and gastric antrum. The endoscope was then retroflexed to examine the angulus, GE junction, cardia, body and fundus and then withdrawn to examine the esophagus.  The endoscope was then removed from the pa

## 2018-06-04 NOTE — ED PROVIDER NOTES
Patient Seen in: [facility] Emergency Department    History   Patient presents with:  Abdomen/Flank Pain (GI/)    Stated Complaint: abd pain    HPI    Pleasant 59-year-old with history of coronary artery disease on Coumadin, history of renal trans 6.00         Types: Cigarettes     Start date: 1/30/2016  Smokeless tobacco: Never Used                      Comment: cigarettes  Alcohol use: Yes           0.0 oz/week     Comment: SOCIAL 2 per month      Review of Systems    Positive for stated complaint URINALYSIS WITH CULTURE REFLEX - Abnormal; Notable for the following:     Glucose Urine 150  (*)     All other components within normal limits   PROTHROMBIN TIME (PT) - Abnormal; Notable for the following:     PT 16.3 (*)     INR 1.26 (*)     All other c LIGHT GREEN   RAINBOW DRAW GOLD       ED Course as of Jun 04 0558  ------------------------------------------------------------    Ct Abdomen+pelvis(cpt=74176)    Result Date: 6/3/2018  PROCEDURE:  CT ABDOMEN+PELVIS (CPT=74176)  COMPARISON:  None.   INDICAT transplant kidney which appears unremarkable. Dictated by: Helena Travis DO on 6/03/2018 at 23:53     Approved by:  Helena Travis DO                    [facility]     Admission disposition: 6/3/2018 11:25 PM         Patient was seen upon arrival kept n.p.o. giv

## 2018-06-04 NOTE — OPERATIVE REPORT
[facility]                                                                                                        EGD Operative Report    Shaneka Boss Patient Status:  Inpatient    10/2/19 EGD.  Suspect melena and cramping was from swallowed blood from oral bleed. Recommendations: pantoprazole once daily. Anticoagulation per cardiology  Discharge:   The patient was given an after visit summary detailing the procedure, findings, recommendati

## 2018-06-04 NOTE — PLAN OF CARE
Maintains or returns to baseline bowel function Progressing      Maintains hematologic stability Progressing      Free from bleeding injury Progressing        NURSING ADMISSION NOTE      Patient admitted via cart  Oriented to room.   Safety precautions init

## 2018-06-04 NOTE — H&P
[facility] Hospitalist H&P       CC: Patient presents with:  Abdomen/Flank Pain (GI/)       PCP: Lindsay Campos MD    History of Present Illness:  Ms Dewey Willis is a 63 yo female with PMH of DM, HTN, CKD stage 4 s/p renal transplant (secondary to SLE), CAD s SURGERY  No date:   No date: OTHER      Comment: kidney transplants x 3  No date: TUBAL LIGATION     ALL:    Tetracycline Base       RASH     Home Medications:    Outpatient Prescriptions Marked as Taking for the 6/3/18 encounter [facility] Encounter): WBC   --   11.2  6.7  5.5  6.1   HGB  5.0*  9.1*  9.7*  10.2*  10.0*   MCV   --   92.4  92.4  92.9  96.1   PLT   --   135.0*  141.0*  220.0  220.0   INR  2.01*  2.51*   --   1.26*   --        Recent Labs   Lab  06/01/18   0700  06/03/18   2152  06/04/18 hernia. URINARY BLADDER:  No visible focal wall thickening, lesion, or calculus. PELVIC NODES:  No adenopathy. PELVIC ORGANS:  No visible mass. Pelvic organs appropriate for patient age. BONES:  No bony lesion or fracture.   LUNG BASES:  No visible pul planning for endoscopy today    Outpatient records reviewed confirming patient's medical history and medications.      Steven Doe  Internal Medicine  [place] Hospitalist

## 2018-06-04 NOTE — INTERVAL H&P NOTE
Pre-op Diagnosis: INPT    The above referenced H&P was reviewed by Donato Vital MD on 6/4/2018, the patient was examined and no significant changes have occurred in the patient's condition since the H&P was performed.   I discussed with the patient and/or leg

## 2018-06-04 NOTE — H&P (VIEW-ONLY)
[facility]    Report of Consultation    Adán Hadley Patient Status:  Inpatient    10/2/1960 MRN [id number]   [facility] 4NW-A Attending Jayla Lopez MD   Hosp Day # 0 PCP Laura Lowry MD     Date of Admission:  6/3/2018 alcohol. She reports that she does not use drugs.     Allergies:    Tetracycline Base       RASH    Medications:    Current Facility-Administered Medications:   •  0.9%  NaCl infusion, , Intravenous, Continuous  •  Pantoprazole Sodium (PROTONIX) 40 mg in So gain, sleep disturbance. Neurological: Denies frequent headaches, history of stroke, recent passing out, recent dizziness, convulsions/seizures, dementia.   Cardiovascular: Denies history of heart murmur, leg swelling, history of rheumatic fever, pacemaker breastfeeding. General: Appears alert, oriented x3 and in no acute distress. HEENT: Normal. No neck vein distention. Thyroid not enlarged. No lymphadenopathy. CV: S1 and S2 normal.  No murmurs or gallops. Lungs: Clear to auscultation.   Abdomen: Soft Trung Olivares  6/4/2018  12:49 PM

## 2018-06-04 NOTE — PROGRESS NOTES
GI UPDATE:    EGD negative. Suspect melena and cramping was from swallowed blood from his oral bleed. PLAN:  -  Regular diet  -  Pantoprazole once daily  -  Ok to restart anticoagulation from a GI standpoint.

## 2018-06-05 NOTE — PAYOR COMM NOTE
--------------  DISCHARGE REVIEW    Payor: Franck Aranda #:  [id number]  Authorization Number: [id number]    Admit date: 6/4/18  Admit time:  1767  Discharge Date: 6/4/2018  5:37 PM     Admitting Physician: Scot Mao

## 2018-06-18 ENCOUNTER — LAB ENCOUNTER (OUTPATIENT)
Dept: LAB | Age: 58
End: 2018-06-18
Attending: NURSE PRACTITIONER
Payer: MEDICAID

## 2018-06-18 DIAGNOSIS — Z79.01 LONG TERM (CURRENT) USE OF ANTICOAGULANTS: ICD-10-CM

## 2018-06-18 DIAGNOSIS — Z94.0 KIDNEY TRANSPLANT RECIPIENT: ICD-10-CM

## 2018-06-18 DIAGNOSIS — I25.10 ATHEROSCLEROSIS OF NATIVE CORONARY ARTERY OF NATIVE HEART WITHOUT ANGINA PECTORIS: ICD-10-CM

## 2018-06-18 DIAGNOSIS — E11.9 TYPE 2 DIABETES MELLITUS WITHOUT COMPLICATION, UNSPECIFIED WHETHER LONG TERM INSULIN USE (HCC): ICD-10-CM

## 2018-06-18 DIAGNOSIS — Z95.5 S/P CORONARY ARTERY STENT PLACEMENT: ICD-10-CM

## 2018-06-18 DIAGNOSIS — N18.4 CHRONIC KIDNEY DISEASE (CKD), STAGE IV (SEVERE) (HCC): ICD-10-CM

## 2018-06-18 DIAGNOSIS — E78.00 PURE HYPERCHOLESTEROLEMIA: ICD-10-CM

## 2018-06-18 DIAGNOSIS — D68.9 COAGULOPATHY (HCC): ICD-10-CM

## 2018-06-18 DIAGNOSIS — E11.9 TYPE 2 DIABETES MELLITUS WITHOUT COMPLICATION (HCC): ICD-10-CM

## 2018-06-18 DIAGNOSIS — I10 ESSENTIAL HYPERTENSION: ICD-10-CM

## 2018-06-18 DIAGNOSIS — I48.0 PAROXYSMAL ATRIAL FIBRILLATION (HCC): ICD-10-CM

## 2018-06-18 DIAGNOSIS — E11.9 DIABETES MELLITUS TYPE 2 IN NONOBESE (HCC): ICD-10-CM

## 2018-06-18 PROCEDURE — 80197 ASSAY OF TACROLIMUS: CPT

## 2018-06-18 PROCEDURE — 80061 LIPID PANEL: CPT

## 2018-06-18 PROCEDURE — 85025 COMPLETE CBC W/AUTO DIFF WBC: CPT

## 2018-06-18 PROCEDURE — 36415 COLL VENOUS BLD VENIPUNCTURE: CPT

## 2018-06-18 PROCEDURE — 82043 UR ALBUMIN QUANTITATIVE: CPT

## 2018-06-18 PROCEDURE — 82570 ASSAY OF URINE CREATININE: CPT

## 2018-06-21 ENCOUNTER — APPOINTMENT (OUTPATIENT)
Dept: LAB | Age: 58
End: 2018-06-21
Attending: INTERNAL MEDICINE
Payer: MEDICAID

## 2018-06-21 DIAGNOSIS — E11.9 DIABETES MELLITUS TYPE 2 IN NONOBESE (HCC): ICD-10-CM

## 2018-06-21 DIAGNOSIS — Z94.0 KIDNEY TRANSPLANT RECIPIENT: ICD-10-CM

## 2018-06-21 PROCEDURE — 36415 COLL VENOUS BLD VENIPUNCTURE: CPT

## 2018-06-21 PROCEDURE — 80048 BASIC METABOLIC PNL TOTAL CA: CPT

## 2018-06-22 ENCOUNTER — TELEPHONE (OUTPATIENT)
Dept: NEPHROLOGY | Facility: CLINIC | Age: 58
End: 2018-06-22

## 2018-11-08 PROCEDURE — 88175 CYTOPATH C/V AUTO FLUID REDO: CPT | Performed by: INTERNAL MEDICINE

## 2018-11-16 ENCOUNTER — LAB ENCOUNTER (OUTPATIENT)
Dept: LAB | Age: 58
End: 2018-11-16
Attending: INTERNAL MEDICINE
Payer: MEDICAID

## 2018-11-16 DIAGNOSIS — Z94.0 KIDNEY TRANSPLANT RECIPIENT: ICD-10-CM

## 2018-11-16 DIAGNOSIS — E11.9 TYPE 2 DIABETES MELLITUS WITHOUT COMPLICATION, UNSPECIFIED WHETHER LONG TERM INSULIN USE (HCC): ICD-10-CM

## 2018-11-16 DIAGNOSIS — Z12.4 SCREENING FOR CERVICAL CANCER: ICD-10-CM

## 2018-11-16 DIAGNOSIS — E11.9 DIABETES MELLITUS TYPE 2 IN NONOBESE (HCC): ICD-10-CM

## 2018-11-16 PROCEDURE — 82570 ASSAY OF URINE CREATININE: CPT

## 2018-11-16 PROCEDURE — 80061 LIPID PANEL: CPT

## 2018-11-16 PROCEDURE — 82043 UR ALBUMIN QUANTITATIVE: CPT

## 2018-11-16 PROCEDURE — 36415 COLL VENOUS BLD VENIPUNCTURE: CPT

## 2018-11-16 PROCEDURE — 85027 COMPLETE CBC AUTOMATED: CPT

## 2018-11-16 PROCEDURE — 87086 URINE CULTURE/COLONY COUNT: CPT

## 2018-11-16 PROCEDURE — 80053 COMPREHEN METABOLIC PANEL: CPT

## 2018-11-16 PROCEDURE — 80197 ASSAY OF TACROLIMUS: CPT

## 2018-11-16 PROCEDURE — 81001 URINALYSIS AUTO W/SCOPE: CPT

## 2018-11-28 ENCOUNTER — TELEPHONE (OUTPATIENT)
Dept: NEPHROLOGY | Facility: CLINIC | Age: 58
End: 2018-11-28

## 2019-01-09 PROCEDURE — 88305 TISSUE EXAM BY PATHOLOGIST: CPT | Performed by: OBSTETRICS & GYNECOLOGY

## 2019-01-22 RX ORDER — TACROLIMUS 1 MG/1
2 CAPSULE ORAL 2 TIMES DAILY
Qty: 120 CAPSULE | Refills: 0 | Status: SHIPPED | OUTPATIENT
Start: 2019-01-22 | End: 2019-02-19

## 2019-02-14 ENCOUNTER — OFFICE VISIT (OUTPATIENT)
Dept: NEPHROLOGY | Facility: CLINIC | Age: 59
End: 2019-02-14
Payer: MEDICAID

## 2019-02-14 VITALS — SYSTOLIC BLOOD PRESSURE: 146 MMHG | BODY MASS INDEX: 29 KG/M2 | WEIGHT: 143 LBS | DIASTOLIC BLOOD PRESSURE: 82 MMHG

## 2019-02-14 DIAGNOSIS — N18.4 CKD (CHRONIC KIDNEY DISEASE) STAGE 4, GFR 15-29 ML/MIN (HCC): Primary | ICD-10-CM

## 2019-02-14 DIAGNOSIS — I10 ESSENTIAL HYPERTENSION: ICD-10-CM

## 2019-02-14 DIAGNOSIS — Z94.0 KIDNEY TRANSPLANT RECIPIENT: ICD-10-CM

## 2019-02-14 PROCEDURE — 99214 OFFICE O/P EST MOD 30 MIN: CPT | Performed by: INTERNAL MEDICINE

## 2019-02-14 NOTE — PROGRESS NOTES
Nephrology Progress Note      ASSESSMENT/PLAN:        1) CKD 4 s/p renal transplant approx 15 yrs ago (SLE)- doing very well with stable Cr approx 2.2 mg/dl over the last 5 years; this is plateaued since she developed acute rejection after steroid withdraw History of oral surgery 05/29/2018   • HYPERTENSION    • Kidney replaced by transplant [id number]    DR. BANKS([place]),   • LGSIL on Pap smear of cervix 11/08/2018   • Lupus erythematosus    • Ovarian cyst    • PONV (postoperative nausea and vomitin tablet Rfl: 3   predniSONE 5 MG Oral Tab Take 1 tablet (5 mg total) by mouth once daily. Disp: 30 tablet Rfl: 6   metoprolol Tartrate 25 MG Oral Tab Take 1 tablet (25 mg total) by mouth 2 (two) times daily.  Disp: 180 tablet Rfl: 2   insulin detemir (NERISI

## 2019-02-20 RX ORDER — TACROLIMUS 1 MG/1
CAPSULE ORAL
Qty: 360 CAPSULE | Refills: 1 | Status: SHIPPED | OUTPATIENT
Start: 2019-02-20 | End: 2019-08-23

## 2019-06-07 ENCOUNTER — LAB ENCOUNTER (OUTPATIENT)
Dept: LAB | Age: 59
End: 2019-06-07
Attending: INTERNAL MEDICINE
Payer: MEDICAID

## 2019-06-07 DIAGNOSIS — Z79.4 TYPE 2 DIABETES MELLITUS WITH STAGE 4 CHRONIC KIDNEY DISEASE, WITH LONG-TERM CURRENT USE OF INSULIN (HCC): ICD-10-CM

## 2019-06-07 DIAGNOSIS — E11.22 TYPE 2 DIABETES MELLITUS WITH STAGE 4 CHRONIC KIDNEY DISEASE, WITH LONG-TERM CURRENT USE OF INSULIN (HCC): ICD-10-CM

## 2019-06-07 DIAGNOSIS — I10 ESSENTIAL HYPERTENSION: ICD-10-CM

## 2019-06-07 DIAGNOSIS — Z94.0 KIDNEY TRANSPLANT RECIPIENT: ICD-10-CM

## 2019-06-07 DIAGNOSIS — N18.4 CKD (CHRONIC KIDNEY DISEASE) STAGE 4, GFR 15-29 ML/MIN (HCC): ICD-10-CM

## 2019-06-07 DIAGNOSIS — N18.4 TYPE 2 DIABETES MELLITUS WITH STAGE 4 CHRONIC KIDNEY DISEASE, WITH LONG-TERM CURRENT USE OF INSULIN (HCC): ICD-10-CM

## 2019-06-07 PROCEDURE — 36415 COLL VENOUS BLD VENIPUNCTURE: CPT

## 2019-06-07 PROCEDURE — 80048 BASIC METABOLIC PNL TOTAL CA: CPT

## 2019-06-07 PROCEDURE — 81003 URINALYSIS AUTO W/O SCOPE: CPT

## 2019-06-07 PROCEDURE — 80197 ASSAY OF TACROLIMUS: CPT

## 2019-06-07 PROCEDURE — 83036 HEMOGLOBIN GLYCOSYLATED A1C: CPT

## 2019-06-07 PROCEDURE — 85025 COMPLETE CBC W/AUTO DIFF WBC: CPT

## 2019-06-10 NOTE — PROGRESS NOTES
Please call  Her sugars are high  Recommend increasing the levemir  I think she is taking 20u and will need to increase to 22u daily   Use the lispro before each meal as discussed  Call in 2 weeks with readings

## 2019-06-12 ENCOUNTER — TELEPHONE (OUTPATIENT)
Dept: NEPHROLOGY | Facility: CLINIC | Age: 59
End: 2019-06-12

## 2019-06-13 ENCOUNTER — TELEPHONE (OUTPATIENT)
Dept: NEPHROLOGY | Facility: CLINIC | Age: 59
End: 2019-06-13

## 2019-06-13 NOTE — TELEPHONE ENCOUNTER
Left VM for pt- renal function stable but prograf level high- unclear if she took her dose this AM before checking levels- asked pt to call back to clarify- martin ramirez

## 2019-06-17 ENCOUNTER — TELEPHONE (OUTPATIENT)
Dept: NEPHROLOGY | Facility: CLINIC | Age: 59
End: 2019-06-17

## 2019-06-17 NOTE — PROGRESS NOTES
[phone]  [facility] #2  Letter sent to [place] pool for printing and mailing

## 2019-06-17 NOTE — TELEPHONE ENCOUNTER
Pt said she DID take her prograf before she had her labs drawn. She also took the evening dose the day before. Next time she gets her labs drawn, should she skip the morning dose?   [phone]

## 2019-06-18 NOTE — TELEPHONE ENCOUNTER
D/w pt labs stable, answered questions about how to get prograf drawn relative to previous dose- thx fang

## 2019-07-25 ENCOUNTER — APPOINTMENT (OUTPATIENT)
Dept: GENERAL RADIOLOGY | Facility: HOSPITAL | Age: 59
End: 2019-07-25
Payer: MEDICAID

## 2019-07-25 ENCOUNTER — HOSPITAL ENCOUNTER (EMERGENCY)
Facility: HOSPITAL | Age: 59
Discharge: HOME | End: 2019-07-25
Payer: MEDICAID

## 2019-07-25 VITALS
BODY MASS INDEX: 27.21 KG/M2 | OXYGEN SATURATION: 99 % | WEIGHT: 135 LBS | HEIGHT: 59 IN | DIASTOLIC BLOOD PRESSURE: 76 MMHG | HEART RATE: 64 BPM | SYSTOLIC BLOOD PRESSURE: 140 MMHG | RESPIRATION RATE: 16 BRPM | TEMPERATURE: 98 F

## 2019-07-25 DIAGNOSIS — S91.214A LACERATION OF LESSER TOE OF RIGHT FOOT WITHOUT FOREIGN BODY WITH DAMAGE TO NAIL, INITIAL ENCOUNTER: ICD-10-CM

## 2019-07-25 DIAGNOSIS — S91.209A AVULSION OF TOENAIL, INITIAL ENCOUNTER: Primary | ICD-10-CM

## 2019-07-25 PROCEDURE — 99283 EMERGENCY DEPT VISIT LOW MDM: CPT

## 2019-07-25 PROCEDURE — 73660 X-RAY EXAM OF TOE(S): CPT

## 2019-07-25 PROCEDURE — 12001 RPR S/N/AX/GEN/TRNK 2.5CM/<: CPT

## 2019-07-25 PROCEDURE — 90471 IMMUNIZATION ADMIN: CPT

## 2019-07-25 PROCEDURE — 99284 EMERGENCY DEPT VISIT MOD MDM: CPT

## 2019-07-26 NOTE — ED PROVIDER NOTES
Patient Seen in: [facility] Emergency Department    History   Patient presents with:  Laceration Abrasion (integumentary)    Stated Complaint: laceration to right foot, on coumadin     HPI    Patient is a 57-year-old female.   Currently anticoagulate comment: cigarettes    Alcohol use:  Yes      Alcohol/week: 0.0 standard drinks      Frequency: Never      Binge frequency: Never      Comment: SOCIAL 2 per month    Drug use: No      Review of Systems    Positive for stated complaint: laceration to right f Shahzad Johnson MD on 7/25/2019 at 23:03     Approved by: Colby Aase, MD               [facility]       Plain film x-ray will be performed to rule out tuft fracture. small nailbed injury with slight lifting of the medial aspect.   There is also a small laceration to

## 2019-08-23 RX ORDER — TACROLIMUS 1 MG/1
CAPSULE ORAL
Qty: 360 CAPSULE | Refills: 1 | Status: SHIPPED | OUTPATIENT
Start: 2019-08-23 | End: 2020-03-06

## 2019-09-04 ENCOUNTER — LAB ENCOUNTER (OUTPATIENT)
Dept: LAB | Age: 59
End: 2019-09-04
Attending: INTERNAL MEDICINE
Payer: MEDICAID

## 2019-09-04 DIAGNOSIS — Z94.0 KIDNEY TRANSPLANT RECIPIENT: ICD-10-CM

## 2019-09-04 DIAGNOSIS — N18.4 CKD (CHRONIC KIDNEY DISEASE) STAGE 4, GFR 15-29 ML/MIN (HCC): ICD-10-CM

## 2019-09-04 DIAGNOSIS — I10 ESSENTIAL HYPERTENSION: ICD-10-CM

## 2019-09-04 LAB
ANION GAP SERPL CALC-SCNC: 6 MMOL/L (ref 0–18)
BASOPHILS # BLD AUTO: 0.04 X10(3) UL (ref 0–0.2)
BASOPHILS NFR BLD AUTO: 1 %
BILIRUB UR QL STRIP.AUTO: NEGATIVE
BUN BLD-MCNC: 32 MG/DL (ref 7–18)
BUN/CREAT SERPL: 14.6 (ref 10–20)
CALCIUM BLD-MCNC: 9.4 MG/DL (ref 8.5–10.1)
CHLORIDE SERPL-SCNC: 110 MMOL/L (ref 98–112)
CLARITY UR REFRACT.AUTO: CLEAR
CO2 SERPL-SCNC: 29 MMOL/L (ref 21–32)
COLOR UR AUTO: YELLOW
CREAT BLD-MCNC: 2.19 MG/DL (ref 0.55–1.02)
DEPRECATED RDW RBC AUTO: 52.6 FL (ref 35.1–46.3)
EOSINOPHIL # BLD AUTO: 0.05 X10(3) UL (ref 0–0.7)
EOSINOPHIL NFR BLD AUTO: 1.3 %
ERYTHROCYTE [DISTWIDTH] IN BLOOD BY AUTOMATED COUNT: 14.6 % (ref 11–15)
GLUCOSE BLD-MCNC: 62 MG/DL (ref 70–99)
GLUCOSE UR STRIP.AUTO-MCNC: NEGATIVE MG/DL
HCT VFR BLD AUTO: 40.5 % (ref 35–48)
HGB BLD-MCNC: 13.7 G/DL (ref 12–16)
IMM GRANULOCYTES # BLD AUTO: 0.01 X10(3) UL (ref 0–1)
IMM GRANULOCYTES NFR BLD: 0.3 %
KETONES UR STRIP.AUTO-MCNC: NEGATIVE MG/DL
LEUKOCYTE ESTERASE UR QL STRIP.AUTO: NEGATIVE
LYMPHOCYTES # BLD AUTO: 1.63 X10(3) UL (ref 1–4)
LYMPHOCYTES NFR BLD AUTO: 42 %
MCH RBC QN AUTO: 33.3 PG (ref 26–34)
MCHC RBC AUTO-ENTMCNC: 33.8 G/DL (ref 31–37)
MCV RBC AUTO: 98.3 FL (ref 80–100)
MONOCYTES # BLD AUTO: 0.43 X10(3) UL (ref 0.1–1)
MONOCYTES NFR BLD AUTO: 11.1 %
NEUTROPHILS # BLD AUTO: 1.72 X10 (3) UL (ref 1.5–7.7)
NEUTROPHILS # BLD AUTO: 1.72 X10(3) UL (ref 1.5–7.7)
NEUTROPHILS NFR BLD AUTO: 44.3 %
NITRITE UR QL STRIP.AUTO: NEGATIVE
OSMOLALITY SERPL CALC.SUM OF ELEC: 305 MOSM/KG (ref 275–295)
PH UR STRIP.AUTO: 6 [PH] (ref 4.5–8)
PLATELET # BLD AUTO: 232 10(3)UL (ref 150–450)
POTASSIUM SERPL-SCNC: 3.3 MMOL/L (ref 3.5–5.1)
PROT UR STRIP.AUTO-MCNC: 30 MG/DL
RBC # BLD AUTO: 4.12 X10(6)UL (ref 3.8–5.3)
SODIUM SERPL-SCNC: 145 MMOL/L (ref 136–145)
SP GR UR STRIP.AUTO: 1.01 (ref 1–1.03)
UROBILINOGEN UR STRIP.AUTO-MCNC: <2 MG/DL
WBC # BLD AUTO: 3.9 X10(3) UL (ref 4–11)

## 2019-09-04 PROCEDURE — 36415 COLL VENOUS BLD VENIPUNCTURE: CPT

## 2019-09-04 PROCEDURE — 85025 COMPLETE CBC W/AUTO DIFF WBC: CPT

## 2019-09-04 PROCEDURE — 80197 ASSAY OF TACROLIMUS: CPT

## 2019-09-04 PROCEDURE — 80048 BASIC METABOLIC PNL TOTAL CA: CPT

## 2019-09-04 PROCEDURE — 81001 URINALYSIS AUTO W/SCOPE: CPT

## 2019-09-05 ENCOUNTER — TELEPHONE (OUTPATIENT)
Dept: NEPHROLOGY | Facility: CLINIC | Age: 59
End: 2019-09-05

## 2019-09-06 LAB — TACROLIMUS: 4.3 NG/ML

## 2019-10-09 ENCOUNTER — APPOINTMENT (OUTPATIENT)
Dept: GENERAL RADIOLOGY | Facility: HOSPITAL | Age: 59
End: 2019-10-09
Attending: EMERGENCY MEDICINE
Payer: MEDICAID

## 2019-10-09 ENCOUNTER — HOSPITAL ENCOUNTER (EMERGENCY)
Facility: HOSPITAL | Age: 59
Discharge: HOME | End: 2019-10-09
Attending: EMERGENCY MEDICINE
Payer: MEDICAID

## 2019-10-09 VITALS
OXYGEN SATURATION: 98 % | HEART RATE: 55 BPM | TEMPERATURE: 98 F | WEIGHT: 141.75 LBS | DIASTOLIC BLOOD PRESSURE: 85 MMHG | HEIGHT: 59 IN | BODY MASS INDEX: 28.58 KG/M2 | RESPIRATION RATE: 16 BRPM | SYSTOLIC BLOOD PRESSURE: 173 MMHG

## 2019-10-09 DIAGNOSIS — M79.89 SWELLING OF LEFT RING FINGER: Primary | ICD-10-CM

## 2019-10-09 PROCEDURE — 36415 COLL VENOUS BLD VENIPUNCTURE: CPT

## 2019-10-09 PROCEDURE — 99283 EMERGENCY DEPT VISIT LOW MDM: CPT

## 2019-10-09 PROCEDURE — 73140 X-RAY EXAM OF FINGER(S): CPT | Performed by: EMERGENCY MEDICINE

## 2019-10-09 PROCEDURE — 85610 PROTHROMBIN TIME: CPT | Performed by: EMERGENCY MEDICINE

## 2019-10-09 RX ORDER — ACETAMINOPHEN 500 MG
1000 TABLET ORAL ONCE
Status: COMPLETED | OUTPATIENT
Start: 2019-10-09 | End: 2019-10-09

## 2019-10-09 NOTE — ED PROVIDER NOTES
Patient Seen in: [facility] Emergency Department      History   Patient presents with:  Swelling Edema (cardiovascular, metabolic)    Stated Complaint: swelling to fingers    HPI    Patient has a history of lupus, kidney transplant, diabetes, high b joint.  The nail and nailbed are unremarkable and no tenderness here. No paronychia. The finger pad itself is nontender. The MCP joint of the fourth digit is nontender. No obvious bite or sting. No deformity.  No lymphangitis       ED Course     Labs R

## 2019-10-09 NOTE — ED INITIAL ASSESSMENT (HPI)
Pt aox4. Pt presents to ed from home accompanied by boyfriend. Pt c/o left 4th digit swelling, pain, and redness to joint. Pt denies trauma/injury to left 4th digit.

## 2019-10-09 NOTE — ED NOTES
Rn placed finger splint on patients 4th digit. Pt tolerated well. Rn instructed patient of increasing prednisone x 2-3 days per Dr. Sarahi Jaeger instructions, RICE, and follow up with pcp with patient. Pt verbalized understanding of dc instructions.  Pt ambulated t

## 2020-03-02 RX ORDER — TACROLIMUS 1 MG/1
CAPSULE ORAL
Qty: 360 CAPSULE | Refills: 1 | OUTPATIENT
Start: 2020-03-02

## 2020-03-06 RX ORDER — TACROLIMUS 1 MG/1
2 CAPSULE ORAL 2 TIMES DAILY
Qty: 120 CAPSULE | Refills: 0 | Status: SHIPPED | OUTPATIENT
Start: 2020-03-06 | End: 2020-04-06

## 2020-03-13 PROBLEM — M32.9 SYSTEMIC LUPUS ERYTHEMATOSUS (HCC): Status: ACTIVE | Noted: 2020-03-13

## 2020-03-13 PROBLEM — I10 ESSENTIAL HYPERTENSION, BENIGN: Status: ACTIVE | Noted: 2020-03-13

## 2020-04-06 RX ORDER — TACROLIMUS 1 MG/1
2 CAPSULE ORAL 2 TIMES DAILY
Qty: 120 CAPSULE | Refills: 0 | Status: SHIPPED | OUTPATIENT
Start: 2020-04-06 | End: 2020-05-06

## 2020-05-04 RX ORDER — TACROLIMUS 1 MG/1
CAPSULE ORAL
Qty: 120 CAPSULE | Refills: 0 | OUTPATIENT
Start: 2020-05-04

## 2020-05-05 RX ORDER — TACROLIMUS 1 MG/1
2 CAPSULE ORAL 2 TIMES DAILY
Qty: 120 CAPSULE | Refills: 0 | OUTPATIENT
Start: 2020-05-05

## 2020-05-06 DIAGNOSIS — Z94.0 STATUS POST KIDNEY TRANSPLANT: Primary | ICD-10-CM

## 2020-05-06 RX ORDER — TACROLIMUS 1 MG/1
2 CAPSULE ORAL 2 TIMES DAILY
Qty: 120 CAPSULE | Refills: 1 | Status: SHIPPED | OUTPATIENT
Start: 2020-05-06 | End: 2020-07-06

## 2020-05-22 ENCOUNTER — LAB ENCOUNTER (OUTPATIENT)
Dept: LAB | Age: 60
End: 2020-05-22
Attending: INTERNAL MEDICINE
Payer: COMMERCIAL

## 2020-05-22 DIAGNOSIS — E11.65 UNCONTROLLED TYPE 2 DIABETES MELLITUS WITH HYPERGLYCEMIA (HCC): ICD-10-CM

## 2020-05-22 DIAGNOSIS — Z94.0 STATUS POST KIDNEY TRANSPLANT: ICD-10-CM

## 2020-05-22 PROCEDURE — 81001 URINALYSIS AUTO W/SCOPE: CPT

## 2020-05-22 PROCEDURE — 80197 ASSAY OF TACROLIMUS: CPT

## 2020-05-22 PROCEDURE — 80048 BASIC METABOLIC PNL TOTAL CA: CPT

## 2020-05-22 PROCEDURE — 82570 ASSAY OF URINE CREATININE: CPT

## 2020-05-22 PROCEDURE — 36415 COLL VENOUS BLD VENIPUNCTURE: CPT

## 2020-05-22 PROCEDURE — 85025 COMPLETE CBC W/AUTO DIFF WBC: CPT

## 2020-05-22 PROCEDURE — 88342 IMHCHEM/IMCYTCHM 1ST ANTB: CPT | Performed by: OBSTETRICS & GYNECOLOGY

## 2020-05-22 PROCEDURE — 82043 UR ALBUMIN QUANTITATIVE: CPT

## 2020-05-22 PROCEDURE — 88305 TISSUE EXAM BY PATHOLOGIST: CPT | Performed by: OBSTETRICS & GYNECOLOGY

## 2020-05-26 ENCOUNTER — TELEPHONE (OUTPATIENT)
Dept: NEPHROLOGY | Facility: CLINIC | Age: 60
End: 2020-05-26

## 2020-05-31 ENCOUNTER — HOSPITAL ENCOUNTER (EMERGENCY)
Facility: HOSPITAL | Age: 60
Discharge: HOME | End: 2020-05-31
Attending: EMERGENCY MEDICINE
Payer: COMMERCIAL

## 2020-05-31 VITALS
HEIGHT: 59 IN | DIASTOLIC BLOOD PRESSURE: 94 MMHG | WEIGHT: 150 LBS | TEMPERATURE: 98 F | SYSTOLIC BLOOD PRESSURE: 119 MMHG | RESPIRATION RATE: 14 BRPM | OXYGEN SATURATION: 100 % | HEART RATE: 75 BPM | BODY MASS INDEX: 30.24 KG/M2

## 2020-05-31 DIAGNOSIS — L29.9 ITCHING: Primary | ICD-10-CM

## 2020-05-31 PROCEDURE — 99283 EMERGENCY DEPT VISIT LOW MDM: CPT

## 2020-05-31 PROCEDURE — 80053 COMPREHEN METABOLIC PANEL: CPT | Performed by: PHYSICIAN ASSISTANT

## 2020-05-31 PROCEDURE — 85730 THROMBOPLASTIN TIME PARTIAL: CPT | Performed by: PHYSICIAN ASSISTANT

## 2020-05-31 PROCEDURE — 85610 PROTHROMBIN TIME: CPT | Performed by: PHYSICIAN ASSISTANT

## 2020-05-31 PROCEDURE — 85025 COMPLETE CBC W/AUTO DIFF WBC: CPT | Performed by: PHYSICIAN ASSISTANT

## 2020-05-31 PROCEDURE — 36415 COLL VENOUS BLD VENIPUNCTURE: CPT

## 2020-05-31 RX ORDER — PREDNISONE 20 MG/1
20 TABLET ORAL DAILY
Qty: 5 TABLET | Refills: 0 | Status: SHIPPED | OUTPATIENT
Start: 2020-05-31 | End: 2020-06-11

## 2020-05-31 RX ORDER — HYDROXYZINE PAMOATE 25 MG/1
25 CAPSULE ORAL 3 TIMES DAILY PRN
Qty: 15 CAPSULE | Refills: 0 | Status: SHIPPED | OUTPATIENT
Start: 2020-05-31 | End: 2020-06-05

## 2020-05-31 NOTE — ED INITIAL ASSESSMENT (HPI)
Pt c/o itching all over everyday for the past 3 weeks. Pt denies shortness of breath or edema anywhere. Only c/o itching.

## 2020-05-31 NOTE — ED NOTES
Pt states she takes benadryl regularly for the past week without relief.  Only makes her feels sleepy

## 2020-05-31 NOTE — ED PROVIDER NOTES
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    Physical Exam     ED Triage Vitals [05/31/20 1430]   BP (!) 119/94   Pulse 75   Resp 14   Temp 98.4 °F (36.9 °C)   Temp src Oral   SpO2 100 %   O2 Device        Current:BP (!) 119/94   Pulse 75   Temp 98.4 °F (36.9 °C) (Oral)   Resp 14   Ht 149. 9 limits   PTT, ACTIVATED - Abnormal; Notable for the following components:    PTT 36.3 (*)     All other components within normal limits   CBC W/ DIFFERENTIAL - Abnormal; Notable for the following components:    RDW-SD 53.7 (*)     All other components with diagnosis)    Disposition:  Discharge  5/31/2020  3:13 pm    Follow-up:  Edith Mckeon MD  [address]  [phone]    In 1 week  If symptoms worsen          Medications Prescribed:  Current Discharge Medica

## 2020-05-31 NOTE — ED NOTES
I reviewed that chart and discussed the case.   I have examined the patient and noted patient is a 15-year-old female presents emergency room with a history of diffuse itching which is been ongoing admittedly for the last 3 weeks with intermittent red spots extremities. SKIN: There is no evidence of any urticarial rash appreciated. There is no evidence of any dermatitis, ecchymosis, or petechial rash appreciated. There is no evidence of any vesicular rash appreciated.   NEURO: Patient is awake, alert and or

## 2020-06-11 ENCOUNTER — OFFICE VISIT (OUTPATIENT)
Dept: NEPHROLOGY | Facility: CLINIC | Age: 60
End: 2020-06-11
Payer: COMMERCIAL

## 2020-06-11 VITALS — SYSTOLIC BLOOD PRESSURE: 122 MMHG | DIASTOLIC BLOOD PRESSURE: 62 MMHG

## 2020-06-11 DIAGNOSIS — Z94.0 KIDNEY TRANSPLANT RECIPIENT: ICD-10-CM

## 2020-06-11 DIAGNOSIS — I10 ESSENTIAL HYPERTENSION: ICD-10-CM

## 2020-06-11 DIAGNOSIS — N18.4 CKD (CHRONIC KIDNEY DISEASE) STAGE 4, GFR 15-29 ML/MIN (HCC): Primary | ICD-10-CM

## 2020-06-11 DIAGNOSIS — L29.9 ITCHING: ICD-10-CM

## 2020-06-11 PROCEDURE — 99214 OFFICE O/P EST MOD 30 MIN: CPT | Performed by: INTERNAL MEDICINE

## 2020-06-11 NOTE — PROGRESS NOTES
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 180 tablet 0   • simvastatin 20 MG Oral Tab Take 1 tablet (20 mg total) by mouth nightly.  90 tablet 0   • amLODIPine Besylate 10 MG Oral Tab Take 1 tablet (10 mg total) by mouth daily rashes/myalgias/arthralgias      PHYSICAL EXAM:   LMP 11/01/2009   Wt Readings from Last 3 Encounters:  05/31/20 : 150 lb (68 kg)  05/26/20 : 150 lb (68 kg)  05/22/20 : 152 lb 3.2 oz (69 kg)    General: Alert and oriented in no apparent distress.   HEENT: N

## 2020-06-22 ENCOUNTER — TELEPHONE (OUTPATIENT)
Dept: NEPHROLOGY | Facility: CLINIC | Age: 60
End: 2020-06-22

## 2020-07-06 RX ORDER — TACROLIMUS 1 MG/1
2 CAPSULE ORAL 2 TIMES DAILY
Qty: 360 CAPSULE | Refills: 1 | Status: SHIPPED | OUTPATIENT
Start: 2020-07-06 | End: 2020-07-08

## 2020-07-08 RX ORDER — TACROLIMUS 1 MG/1
2 CAPSULE ORAL 2 TIMES DAILY
Qty: 360 CAPSULE | Refills: 1 | Status: SHIPPED | OUTPATIENT
Start: 2020-07-08 | End: 2020-12-15

## 2020-09-09 RX ORDER — TACROLIMUS 1 MG/1
2 CAPSULE, GELATIN COATED ORAL 2 TIMES DAILY
Qty: 360 CAPSULE | Refills: 1 | Status: SHIPPED | OUTPATIENT
Start: 2020-09-09 | End: 2021-05-19

## 2020-09-11 NOTE — TELEPHONE ENCOUNTER
Patient calling stating that [facility] has not received the Prograft refill which was signed by  on 9/9/2020. Called [facility] to clarify.     [facility] states they have received the rx request and it has not shipped out yet as the rx is in the \"1st f

## 2020-10-07 RX ORDER — PREDNISONE 1 MG/1
5 TABLET ORAL DAILY
Qty: 30 TABLET | Refills: 5 | Status: SHIPPED | OUTPATIENT
Start: 2020-10-07 | End: 2021-04-12

## 2020-10-20 RX ORDER — AZATHIOPRINE 50 MG/1
50 TABLET ORAL DAILY
Qty: 90 TABLET | Refills: 1 | Status: SHIPPED | OUTPATIENT
Start: 2020-10-20 | End: 2021-05-11

## 2020-12-15 ENCOUNTER — TELEPHONE (OUTPATIENT)
Dept: NEPHROLOGY | Facility: CLINIC | Age: 60
End: 2020-12-15

## 2020-12-15 RX ORDER — TACROLIMUS 1 MG/1
2 CAPSULE ORAL 2 TIMES DAILY
Qty: 360 CAPSULE | Refills: 1 | Status: SHIPPED | OUTPATIENT
Start: 2020-12-15 | End: 2021-06-23

## 2021-01-14 PROCEDURE — 3061F NEG MICROALBUMINURIA REV: CPT | Performed by: INTERNAL MEDICINE

## 2021-04-12 RX ORDER — PREDNISONE 1 MG/1
5 TABLET ORAL DAILY
Qty: 60 TABLET | Refills: 0 | Status: SHIPPED | OUTPATIENT
Start: 2021-04-12 | End: 2021-06-24

## 2021-04-22 DIAGNOSIS — Z94.0 STATUS POST KIDNEY TRANSPLANT: Primary | ICD-10-CM

## 2021-04-23 ENCOUNTER — LAB ENCOUNTER (OUTPATIENT)
Dept: LAB | Age: 61
End: 2021-04-23
Attending: INTERNAL MEDICINE
Payer: COMMERCIAL

## 2021-04-23 ENCOUNTER — TELEPHONE (OUTPATIENT)
Dept: NEPHROLOGY | Facility: CLINIC | Age: 61
End: 2021-04-23

## 2021-04-23 DIAGNOSIS — Z94.0 STATUS POST KIDNEY TRANSPLANT: ICD-10-CM

## 2021-04-23 PROCEDURE — 85025 COMPLETE CBC W/AUTO DIFF WBC: CPT

## 2021-04-23 PROCEDURE — 81003 URINALYSIS AUTO W/O SCOPE: CPT

## 2021-04-23 PROCEDURE — 36415 COLL VENOUS BLD VENIPUNCTURE: CPT

## 2021-04-23 PROCEDURE — 80048 BASIC METABOLIC PNL TOTAL CA: CPT

## 2021-04-23 PROCEDURE — 80197 ASSAY OF TACROLIMUS: CPT

## 2021-05-10 NOTE — TELEPHONE ENCOUNTER
Patient Called in  HIPAA Verified  Patient calling for query about a VM she received  Relayed Dr. Dillon Carlos comment:  Christofer Mejia function and baseline\"  No additional concerns noted  Patient verbalize understanding

## 2021-05-11 RX ORDER — AZATHIOPRINE 50 MG/1
50 TABLET ORAL DAILY
Qty: 30 TABLET | Refills: 0 | Status: SHIPPED | OUTPATIENT
Start: 2021-05-11 | End: 2021-06-14

## 2021-05-18 ENCOUNTER — HOSPITAL ENCOUNTER (EMERGENCY)
Facility: HOSPITAL | Age: 61
Discharge: HOME | End: 2021-05-19
Attending: EMERGENCY MEDICINE
Payer: COMMERCIAL

## 2021-05-18 ENCOUNTER — APPOINTMENT (OUTPATIENT)
Dept: GENERAL RADIOLOGY | Facility: HOSPITAL | Age: 61
End: 2021-05-18
Attending: EMERGENCY MEDICINE
Payer: COMMERCIAL

## 2021-05-18 DIAGNOSIS — M32.9 SYSTEMIC LUPUS ERYTHEMATOSUS, UNSPECIFIED SLE TYPE, UNSPECIFIED ORGAN INVOLVEMENT STATUS (HCC): ICD-10-CM

## 2021-05-18 DIAGNOSIS — M19.90 INFLAMMATORY ARTHRITIS: Primary | ICD-10-CM

## 2021-05-18 PROCEDURE — 73130 X-RAY EXAM OF HAND: CPT | Performed by: EMERGENCY MEDICINE

## 2021-05-18 PROCEDURE — 96374 THER/PROPH/DIAG INJ IV PUSH: CPT

## 2021-05-18 PROCEDURE — 36415 COLL VENOUS BLD VENIPUNCTURE: CPT

## 2021-05-18 PROCEDURE — 85610 PROTHROMBIN TIME: CPT | Performed by: EMERGENCY MEDICINE

## 2021-05-18 PROCEDURE — 80048 BASIC METABOLIC PNL TOTAL CA: CPT | Performed by: EMERGENCY MEDICINE

## 2021-05-18 PROCEDURE — 85652 RBC SED RATE AUTOMATED: CPT | Performed by: EMERGENCY MEDICINE

## 2021-05-18 PROCEDURE — 86140 C-REACTIVE PROTEIN: CPT | Performed by: EMERGENCY MEDICINE

## 2021-05-18 PROCEDURE — 85025 COMPLETE CBC W/AUTO DIFF WBC: CPT | Performed by: EMERGENCY MEDICINE

## 2021-05-18 PROCEDURE — 99284 EMERGENCY DEPT VISIT MOD MDM: CPT

## 2021-05-18 PROCEDURE — 87040 BLOOD CULTURE FOR BACTERIA: CPT | Performed by: EMERGENCY MEDICINE

## 2021-05-18 PROCEDURE — 96375 TX/PRO/DX INJ NEW DRUG ADDON: CPT

## 2021-05-18 PROCEDURE — 99285 EMERGENCY DEPT VISIT HI MDM: CPT

## 2021-05-18 RX ORDER — MORPHINE SULFATE 2 MG/ML
2 INJECTION, SOLUTION INTRAMUSCULAR; INTRAVENOUS ONCE
Status: COMPLETED | OUTPATIENT
Start: 2021-05-18 | End: 2021-05-18

## 2021-05-18 RX ORDER — METHYLPREDNISOLONE SODIUM SUCCINATE 125 MG/2ML
125 INJECTION, POWDER, LYOPHILIZED, FOR SOLUTION INTRAMUSCULAR; INTRAVENOUS ONCE
Status: COMPLETED | OUTPATIENT
Start: 2021-05-18 | End: 2021-05-19

## 2021-05-19 VITALS
HEIGHT: 60 IN | SYSTOLIC BLOOD PRESSURE: 142 MMHG | OXYGEN SATURATION: 96 % | TEMPERATURE: 98 F | RESPIRATION RATE: 16 BRPM | WEIGHT: 130 LBS | HEART RATE: 82 BPM | DIASTOLIC BLOOD PRESSURE: 90 MMHG | BODY MASS INDEX: 25.52 KG/M2

## 2021-05-19 PROCEDURE — 3044F HG A1C LEVEL LT 7.0%: CPT | Performed by: INTERNAL MEDICINE

## 2021-05-19 RX ORDER — METHYLPREDNISOLONE 4 MG/1
TABLET ORAL
Qty: 1 PACKAGE | Refills: 0 | Status: SHIPPED | OUTPATIENT
Start: 2021-05-19 | End: 2021-07-21

## 2021-05-19 RX ORDER — HYDROCODONE BITARTRATE AND ACETAMINOPHEN 5; 325 MG/1; MG/1
1 TABLET ORAL EVERY 6 HOURS PRN
Qty: 10 TABLET | Refills: 0 | Status: SHIPPED | OUTPATIENT
Start: 2021-05-19 | End: 2021-05-26

## 2021-05-19 RX ORDER — HYDROCODONE BITARTRATE AND ACETAMINOPHEN 5; 325 MG/1; MG/1
1 TABLET ORAL ONCE
Status: COMPLETED | OUTPATIENT
Start: 2021-05-19 | End: 2021-05-19

## 2021-05-19 NOTE — ED INITIAL ASSESSMENT (HPI)
Pt reports pain and swelling to left hand, wrist and elbow. Denies injury. Symptoms starting yesterday. Attempting to remove thumb ring.

## 2021-05-19 NOTE — ED PROVIDER NOTES
Patient Seen in: [facility] Emergency Department      History   Patient presents with:  Swelling Edema    Stated Complaint: swelling to fingers    HPI/Subjective:   HPI    70-year-old female with multiple medical problems including lupus, status pos BIOPSY, POSSIBLE POLYPECTOMY 66790;  Surgeon:  Dayanna Haney MD;  Location: [address] [place] [address]   • COLPOSCOPY,BX CERVIX/ENDOCERV CURR  2018   • COLPOSCOPY,BX CERVIX/ENDOCERV CURR  2020   • KIDNEY SURGERY     •      • OTHER      ki is mild erythema and swelling to the dorsum of the left hand, there is erythema and swelling to the first third and fourth digit. Capillary refill is in 2 seconds all digits. No peripheral edema  SKIN: Warm, dry, intact, no rashes.   NEUROLOGIC EXAM: Radi joints, particular lucencies suggest erosions. Possibly due to inflammatory arthropathy. Lab work performed CBC white count 5.0 hemoglobin 13.1 platelet 299. Chemistry sodium 131 potassium 3.8 BUN 24 creatinine 1.88 glucose 202.   Sed rate was elevated a

## 2021-06-08 RX ORDER — AZATHIOPRINE 50 MG/1
TABLET ORAL
Qty: 30 TABLET | Refills: 0 | OUTPATIENT
Start: 2021-06-08

## 2021-06-14 RX ORDER — AZATHIOPRINE 50 MG/1
TABLET ORAL
Qty: 90 TABLET | Refills: 0 | Status: SHIPPED | OUTPATIENT
Start: 2021-06-14 | End: 2021-07-30

## 2021-06-17 RX ORDER — TACROLIMUS 1 MG/1
CAPSULE ORAL
Qty: 360 CAPSULE | Refills: 0 | OUTPATIENT
Start: 2021-06-17

## 2021-06-21 RX ORDER — TACROLIMUS 1 MG/1
2 CAPSULE ORAL 2 TIMES DAILY
Qty: 360 CAPSULE | Refills: 1 | OUTPATIENT
Start: 2021-06-21

## 2021-06-21 RX ORDER — PREDNISONE 5 MG/1
TABLET ORAL
Qty: 60 TABLET | Refills: 0 | OUTPATIENT
Start: 2021-06-21

## 2021-06-23 DIAGNOSIS — Z94.0 STATUS POST KIDNEY TRANSPLANT: Primary | ICD-10-CM

## 2021-06-23 RX ORDER — TACROLIMUS 1 MG/1
2 CAPSULE ORAL 2 TIMES DAILY
Qty: 120 CAPSULE | Refills: 0 | Status: SHIPPED | OUTPATIENT
Start: 2021-06-23 | End: 2021-07-30

## 2021-06-24 RX ORDER — PREDNISONE 1 MG/1
TABLET ORAL
Qty: 60 TABLET | Refills: 0 | Status: SHIPPED | OUTPATIENT
Start: 2021-06-24 | End: 2021-07-30

## 2021-07-14 ENCOUNTER — LAB ENCOUNTER (OUTPATIENT)
Dept: LAB | Age: 61
End: 2021-07-14
Attending: INTERNAL MEDICINE
Payer: COMMERCIAL

## 2021-07-14 DIAGNOSIS — Z94.0 STATUS POST KIDNEY TRANSPLANT: ICD-10-CM

## 2021-07-14 LAB
ANION GAP SERPL CALC-SCNC: 3 MMOL/L (ref 0–18)
BASOPHILS # BLD AUTO: 0.05 X10(3) UL (ref 0–0.2)
BASOPHILS NFR BLD AUTO: 1.5 %
BILIRUB UR QL STRIP.AUTO: NEGATIVE
BUN BLD-MCNC: 22 MG/DL (ref 7–18)
BUN/CREAT SERPL: 10.9 (ref 10–20)
CALCIUM BLD-MCNC: 9.1 MG/DL (ref 8.5–10.1)
CHLORIDE SERPL-SCNC: 109 MMOL/L (ref 98–112)
CO2 SERPL-SCNC: 28 MMOL/L (ref 21–32)
COLOR UR AUTO: YELLOW
CREAT BLD-MCNC: 2.02 MG/DL
DEPRECATED RDW RBC AUTO: 55.8 FL (ref 35.1–46.3)
EOSINOPHIL # BLD AUTO: 0.07 X10(3) UL (ref 0–0.7)
EOSINOPHIL NFR BLD AUTO: 2.1 %
ERYTHROCYTE [DISTWIDTH] IN BLOOD BY AUTOMATED COUNT: 14.6 % (ref 11–15)
GLUCOSE BLD-MCNC: 136 MG/DL (ref 70–99)
GLUCOSE UR STRIP.AUTO-MCNC: NEGATIVE MG/DL
HCT VFR BLD AUTO: 36.7 %
HGB BLD-MCNC: 11.9 G/DL
IMM GRANULOCYTES # BLD AUTO: 0.01 X10(3) UL (ref 0–1)
IMM GRANULOCYTES NFR BLD: 0.3 %
KETONES UR STRIP.AUTO-MCNC: NEGATIVE MG/DL
LEUKOCYTE ESTERASE UR QL STRIP.AUTO: NEGATIVE
LYMPHOCYTES # BLD AUTO: 1.3 X10(3) UL (ref 1–4)
LYMPHOCYTES NFR BLD AUTO: 38.1 %
MCH RBC QN AUTO: 33.1 PG (ref 26–34)
MCHC RBC AUTO-ENTMCNC: 32.4 G/DL (ref 31–37)
MCV RBC AUTO: 101.9 FL
MONOCYTES # BLD AUTO: 0.45 X10(3) UL (ref 0.1–1)
MONOCYTES NFR BLD AUTO: 13.2 %
NEUTROPHILS # BLD AUTO: 1.53 X10 (3) UL (ref 1.5–7.7)
NEUTROPHILS # BLD AUTO: 1.53 X10(3) UL (ref 1.5–7.7)
NEUTROPHILS NFR BLD AUTO: 44.8 %
NITRITE UR QL STRIP.AUTO: NEGATIVE
OSMOLALITY SERPL CALC.SUM OF ELEC: 295 MOSM/KG (ref 275–295)
PATIENT FASTING Y/N/NP: NO
PH UR STRIP.AUTO: 7 [PH] (ref 5–8)
PLATELET # BLD AUTO: 215 10(3)UL (ref 150–450)
POTASSIUM SERPL-SCNC: 3.5 MMOL/L (ref 3.5–5.1)
PROT UR STRIP.AUTO-MCNC: NEGATIVE MG/DL
RBC # BLD AUTO: 3.6 X10(6)UL
RBC UR QL AUTO: NEGATIVE
SODIUM SERPL-SCNC: 140 MMOL/L (ref 136–145)
SP GR UR STRIP.AUTO: 1.01 (ref 1–1.03)
UROBILINOGEN UR STRIP.AUTO-MCNC: <2 MG/DL
WBC # BLD AUTO: 3.4 X10(3) UL (ref 4–11)

## 2021-07-14 PROCEDURE — 80197 ASSAY OF TACROLIMUS: CPT

## 2021-07-14 PROCEDURE — 85025 COMPLETE CBC W/AUTO DIFF WBC: CPT

## 2021-07-14 PROCEDURE — 81003 URINALYSIS AUTO W/O SCOPE: CPT

## 2021-07-14 PROCEDURE — 80048 BASIC METABOLIC PNL TOTAL CA: CPT

## 2021-07-14 PROCEDURE — 36415 COLL VENOUS BLD VENIPUNCTURE: CPT

## 2021-07-16 LAB — TACROLIMUS: 17.9 NG/ML

## 2021-07-22 ENCOUNTER — OFFICE VISIT (OUTPATIENT)
Dept: NEPHROLOGY | Facility: CLINIC | Age: 61
End: 2021-07-22
Payer: COMMERCIAL

## 2021-07-22 VITALS — SYSTOLIC BLOOD PRESSURE: 112 MMHG | BODY MASS INDEX: 26 KG/M2 | WEIGHT: 132.38 LBS | DIASTOLIC BLOOD PRESSURE: 70 MMHG

## 2021-07-22 DIAGNOSIS — T86.11 CHRONIC RENAL ALLOGRAFT NEPHROPATHY: ICD-10-CM

## 2021-07-22 DIAGNOSIS — N18.4 CKD (CHRONIC KIDNEY DISEASE) STAGE 4, GFR 15-29 ML/MIN (HCC): Primary | ICD-10-CM

## 2021-07-22 DIAGNOSIS — Z94.0 KIDNEY TRANSPLANT RECIPIENT: ICD-10-CM

## 2021-07-22 DIAGNOSIS — I10 ESSENTIAL HYPERTENSION: ICD-10-CM

## 2021-07-22 PROCEDURE — 3078F DIAST BP <80 MM HG: CPT | Performed by: INTERNAL MEDICINE

## 2021-07-22 PROCEDURE — 99214 OFFICE O/P EST MOD 30 MIN: CPT | Performed by: INTERNAL MEDICINE

## 2021-07-22 PROCEDURE — 3074F SYST BP LT 130 MM HG: CPT | Performed by: INTERNAL MEDICINE

## 2021-07-22 RX ORDER — TACROLIMUS 1 MG/1
2 CAPSULE ORAL 2 TIMES DAILY
Qty: 360 CAPSULE | Refills: 3 | Status: SHIPPED | OUTPATIENT
Start: 2021-07-22

## 2021-07-22 RX ORDER — AZATHIOPRINE 50 MG/1
50 TABLET ORAL DAILY
Qty: 90 TABLET | Refills: 3 | Status: SHIPPED | OUTPATIENT
Start: 2021-07-22

## 2021-07-22 RX ORDER — PREDNISONE 1 MG/1
5 TABLET ORAL DAILY
Qty: 90 TABLET | Refills: 3 | Status: SHIPPED | OUTPATIENT
Start: 2021-07-22

## 2021-07-22 NOTE — PROGRESS NOTES
Nephrology Progress Note      ASSESSMENT/PLAN:      1) CKD 4 s/p renal transplant approx 15 yrs ago (SLE)- doing very well with stable Cr approx 2.2 mg/dl > 5 years; this has plateaued since she developed acute rejection after steroid withdrawal.  Her rout DARREN([place]),   • LGSIL on Pap smear of cervix 11/08/2018, 5/2020   • LGSIL on Pap smear of cervix 04/2020   • Lupus erythematosus    • Ovarian cyst    • PONV (postoperative nausea and vomiting)    • RENAL DISEASE     kidney transplant      Past Surgic capsules (2 mg total) by mouth 2 (two) times daily.  120 capsule 0   • AZATHIOPRINE 50 MG Oral Tab Take 1 tablet by mouth once daily 90 tablet 0   • Insulin Pen Needle (BD PEN NEEDLE MINI U/F) 31G X 5 MM Does not apply Misc bid [address]   • [facility]

## 2021-07-30 ENCOUNTER — LAB ENCOUNTER (OUTPATIENT)
Dept: LAB | Age: 61
End: 2021-07-30
Attending: INTERNAL MEDICINE
Payer: COMMERCIAL

## 2021-07-30 DIAGNOSIS — N18.4 CKD (CHRONIC KIDNEY DISEASE) STAGE 4, GFR 15-29 ML/MIN (HCC): ICD-10-CM

## 2021-07-30 DIAGNOSIS — T86.11 CHRONIC RENAL ALLOGRAFT NEPHROPATHY: ICD-10-CM

## 2021-07-30 DIAGNOSIS — I10 ESSENTIAL HYPERTENSION: ICD-10-CM

## 2021-07-30 DIAGNOSIS — Z94.0 KIDNEY TRANSPLANT RECIPIENT: ICD-10-CM

## 2021-07-30 PROBLEM — M79.641 RIGHT HAND PAIN: Status: ACTIVE | Noted: 2021-07-30

## 2021-07-30 PROBLEM — M32.14 STAGE IV LUPUS NEPHRITIS (WHO) (HCC): Status: ACTIVE | Noted: 2021-07-30

## 2021-07-30 PROCEDURE — 80197 ASSAY OF TACROLIMUS: CPT

## 2021-07-30 PROCEDURE — 36415 COLL VENOUS BLD VENIPUNCTURE: CPT

## 2021-08-02 LAB — TACROLIMUS: 6.2 NG/ML

## 2021-09-03 DIAGNOSIS — T86.11 CHRONIC RENAL ALLOGRAFT NEPHROPATHY: ICD-10-CM

## 2021-09-03 DIAGNOSIS — I10 ESSENTIAL HYPERTENSION: ICD-10-CM

## 2021-09-03 DIAGNOSIS — Z94.0 KIDNEY TRANSPLANT RECIPIENT: ICD-10-CM

## 2021-09-03 DIAGNOSIS — N18.4 CKD (CHRONIC KIDNEY DISEASE) STAGE 4, GFR 15-29 ML/MIN (HCC): ICD-10-CM

## 2021-09-07 RX ORDER — AZATHIOPRINE 50 MG/1
TABLET ORAL
Qty: 90 TABLET | Refills: 0 | OUTPATIENT
Start: 2021-09-07

## 2022-02-07 ENCOUNTER — LAB ENCOUNTER (OUTPATIENT)
Dept: LAB | Age: 62
End: 2022-02-07
Attending: INTERNAL MEDICINE
Payer: COMMERCIAL

## 2022-02-07 DIAGNOSIS — Z94.0 STATUS POST KIDNEY TRANSPLANT: ICD-10-CM

## 2022-02-07 DIAGNOSIS — I48.0 PAROXYSMAL ATRIAL FIBRILLATION (HCC): ICD-10-CM

## 2022-02-07 DIAGNOSIS — I10 ESSENTIAL HYPERTENSION: ICD-10-CM

## 2022-02-07 DIAGNOSIS — Z94.0 RENAL TRANSPLANT RECIPIENT: ICD-10-CM

## 2022-02-07 DIAGNOSIS — Z79.01 LONG TERM (CURRENT) USE OF ANTICOAGULANTS: ICD-10-CM

## 2022-02-07 DIAGNOSIS — Z95.5 S/P CORONARY ARTERY STENT PLACEMENT: ICD-10-CM

## 2022-02-07 DIAGNOSIS — E78.00 PURE HYPERCHOLESTEROLEMIA: ICD-10-CM

## 2022-02-07 LAB
ALBUMIN SERPL-MCNC: 3.7 G/DL (ref 3.4–5)
ALBUMIN/GLOB SERPL: 1.2 {RATIO} (ref 1–2)
ALT SERPL-CCNC: 19 U/L
ANION GAP SERPL CALC-SCNC: 7 MMOL/L (ref 0–18)
AST SERPL-CCNC: 21 U/L (ref 15–37)
BASOPHILS # BLD AUTO: 0.04 X10(3) UL (ref 0–0.2)
BASOPHILS NFR BLD AUTO: 1.1 %
BILIRUB SERPL-MCNC: 0.6 MG/DL (ref 0.1–2)
BILIRUB UR QL STRIP.AUTO: NEGATIVE
BUN BLD-MCNC: 24 MG/DL (ref 7–18)
CALCIUM BLD-MCNC: 9.5 MG/DL (ref 8.5–10.1)
CHLORIDE SERPL-SCNC: 109 MMOL/L (ref 98–112)
CLARITY UR REFRACT.AUTO: CLEAR
CO2 SERPL-SCNC: 25 MMOL/L (ref 21–32)
COLOR UR AUTO: YELLOW
CREAT BLD-MCNC: 2.04 MG/DL
EOSINOPHIL # BLD AUTO: 0.03 X10(3) UL (ref 0–0.7)
EOSINOPHIL NFR BLD AUTO: 0.8 %
ERYTHROCYTE [DISTWIDTH] IN BLOOD BY AUTOMATED COUNT: 15.7 %
FASTING STATUS PATIENT QL REPORTED: NO
GLOBULIN PLAS-MCNC: 3 G/DL (ref 2.8–4.4)
GLUCOSE BLD-MCNC: 119 MG/DL (ref 70–99)
GLUCOSE UR STRIP.AUTO-MCNC: NEGATIVE MG/DL
HCT VFR BLD AUTO: 36.3 %
HGB BLD-MCNC: 12.1 G/DL
IMM GRANULOCYTES # BLD AUTO: 0.01 X10(3) UL (ref 0–1)
IMM GRANULOCYTES NFR BLD: 0.3 %
INR BLD: 2.43 (ref 0.8–1.2)
KETONES UR STRIP.AUTO-MCNC: NEGATIVE MG/DL
LEUKOCYTE ESTERASE UR QL STRIP.AUTO: NEGATIVE
LYMPHOCYTES # BLD AUTO: 1 X10(3) UL (ref 1–4)
MCH RBC QN AUTO: 33.2 PG (ref 26–34)
MCHC RBC AUTO-ENTMCNC: 33.3 G/DL (ref 31–37)
MCV RBC AUTO: 99.5 FL
MONOCYTES # BLD AUTO: 0.37 X10(3) UL (ref 0.1–1)
MONOCYTES NFR BLD AUTO: 9.9 %
NEUTROPHILS # BLD AUTO: 2.29 X10 (3) UL (ref 1.5–7.7)
NEUTROPHILS # BLD AUTO: 2.29 X10(3) UL (ref 1.5–7.7)
NEUTROPHILS NFR BLD AUTO: 61.2 %
NITRITE UR QL STRIP.AUTO: NEGATIVE
OSMOLALITY SERPL CALC.SUM OF ELEC: 297 MOSM/KG (ref 275–295)
PH UR STRIP.AUTO: 6 [PH] (ref 5–8)
PLATELET # BLD AUTO: 194 10(3)UL (ref 150–450)
POTASSIUM SERPL-SCNC: 3.4 MMOL/L (ref 3.5–5.1)
PROT SERPL-MCNC: 6.7 G/DL (ref 6.4–8.2)
PROT UR STRIP.AUTO-MCNC: NEGATIVE MG/DL
PROTHROMBIN TIME: 26.6 SECONDS (ref 11.6–14.8)
RBC # BLD AUTO: 3.65 X10(6)UL
SODIUM SERPL-SCNC: 141 MMOL/L (ref 136–145)
SP GR UR STRIP.AUTO: 1.01 (ref 1–1.03)
UROBILINOGEN UR STRIP.AUTO-MCNC: 2 MG/DL
WBC # BLD AUTO: 3.7 X10(3) UL (ref 4–11)

## 2022-02-07 PROCEDURE — 36415 COLL VENOUS BLD VENIPUNCTURE: CPT

## 2022-02-07 PROCEDURE — 85610 PROTHROMBIN TIME: CPT

## 2022-02-07 PROCEDURE — 81001 URINALYSIS AUTO W/SCOPE: CPT

## 2022-02-07 PROCEDURE — 85025 COMPLETE CBC W/AUTO DIFF WBC: CPT

## 2022-02-07 PROCEDURE — 80053 COMPREHEN METABOLIC PANEL: CPT

## 2022-02-07 PROCEDURE — 80197 ASSAY OF TACROLIMUS: CPT

## 2022-02-08 ENCOUNTER — TELEPHONE (OUTPATIENT)
Dept: NEPHROLOGY | Facility: CLINIC | Age: 62
End: 2022-02-08

## 2022-02-09 LAB — TACROLIMUS: 14.3 NG/ML

## 2022-02-11 ENCOUNTER — TELEPHONE (OUTPATIENT)
Dept: NEPHROLOGY | Facility: CLINIC | Age: 62
End: 2022-02-11

## 2022-02-11 NOTE — TELEPHONE ENCOUNTER
VERO pt- pt took prograf before going to lab so result is not a trough level- continue current dosing- martin ramirez

## 2022-07-21 DIAGNOSIS — T86.11 CHRONIC RENAL ALLOGRAFT NEPHROPATHY: ICD-10-CM

## 2022-07-21 DIAGNOSIS — Z94.0 KIDNEY TRANSPLANT RECIPIENT: ICD-10-CM

## 2022-07-21 DIAGNOSIS — I10 ESSENTIAL HYPERTENSION: ICD-10-CM

## 2022-07-21 DIAGNOSIS — N18.4 CKD (CHRONIC KIDNEY DISEASE) STAGE 4, GFR 15-29 ML/MIN (HCC): ICD-10-CM

## 2022-07-22 DIAGNOSIS — I10 ESSENTIAL HYPERTENSION: ICD-10-CM

## 2022-07-22 DIAGNOSIS — Z94.0 KIDNEY TRANSPLANT RECIPIENT: ICD-10-CM

## 2022-07-22 DIAGNOSIS — N18.4 CKD (CHRONIC KIDNEY DISEASE) STAGE 4, GFR 15-29 ML/MIN (HCC): ICD-10-CM

## 2022-07-22 DIAGNOSIS — T86.11 CHRONIC RENAL ALLOGRAFT NEPHROPATHY: ICD-10-CM

## 2022-07-22 RX ORDER — TACROLIMUS 1 MG/1
2 CAPSULE ORAL 2 TIMES DAILY
Qty: 360 CAPSULE | Refills: 3 | Status: SHIPPED | OUTPATIENT
Start: 2022-07-22

## 2022-07-22 RX ORDER — TACROLIMUS 1 MG/1
CAPSULE ORAL
Qty: 360 CAPSULE | Refills: 0 | OUTPATIENT
Start: 2022-07-22

## 2022-08-14 DIAGNOSIS — I10 ESSENTIAL HYPERTENSION: ICD-10-CM

## 2022-08-14 DIAGNOSIS — N18.4 CKD (CHRONIC KIDNEY DISEASE) STAGE 4, GFR 15-29 ML/MIN (HCC): ICD-10-CM

## 2022-08-14 DIAGNOSIS — Z94.0 KIDNEY TRANSPLANT RECIPIENT: ICD-10-CM

## 2022-08-14 DIAGNOSIS — T86.11 CHRONIC RENAL ALLOGRAFT NEPHROPATHY: ICD-10-CM

## 2022-08-15 RX ORDER — PREDNISONE 5 MG/1
TABLET ORAL
Qty: 60 TABLET | Refills: 0 | OUTPATIENT
Start: 2022-08-15

## 2022-08-18 ENCOUNTER — TELEPHONE (OUTPATIENT)
Dept: NEPHROLOGY | Facility: CLINIC | Age: 62
End: 2022-08-18

## 2022-08-18 NOTE — TELEPHONE ENCOUNTER
Patient scheduled yearly f/u for 9/14. She is out of prednisone.     Please send 30 days to:  Shiraz Wilson [address] ([address] [place] [address]), [place] - [address] [facility] [phone], [phone]

## 2022-08-20 RX ORDER — PREDNISONE 1 MG/1
5 TABLET ORAL DAILY
Qty: 30 TABLET | Refills: 0 | Status: SHIPPED | OUTPATIENT
Start: 2022-08-20

## 2022-09-06 DIAGNOSIS — I10 ESSENTIAL HYPERTENSION: ICD-10-CM

## 2022-09-06 DIAGNOSIS — Z94.0 KIDNEY TRANSPLANT RECIPIENT: ICD-10-CM

## 2022-09-06 DIAGNOSIS — N18.4 CKD (CHRONIC KIDNEY DISEASE) STAGE 4, GFR 15-29 ML/MIN (HCC): Primary | ICD-10-CM

## 2022-09-12 ENCOUNTER — LAB ENCOUNTER (OUTPATIENT)
Dept: LAB | Age: 62
End: 2022-09-12
Attending: INTERNAL MEDICINE
Payer: COMMERCIAL

## 2022-09-12 DIAGNOSIS — N18.4 CKD (CHRONIC KIDNEY DISEASE) STAGE 4, GFR 15-29 ML/MIN (HCC): ICD-10-CM

## 2022-09-12 DIAGNOSIS — Z94.0 KIDNEY TRANSPLANT RECIPIENT: ICD-10-CM

## 2022-09-12 DIAGNOSIS — I10 ESSENTIAL HYPERTENSION: ICD-10-CM

## 2022-09-12 LAB
ANION GAP SERPL CALC-SCNC: 4 MMOL/L (ref 0–18)
BASOPHILS # BLD AUTO: 0.06 X10(3) UL (ref 0–0.2)
BASOPHILS NFR BLD AUTO: 1.5 %
BILIRUB UR QL STRIP.AUTO: NEGATIVE
BUN BLD-MCNC: 35 MG/DL (ref 7–18)
CALCIUM BLD-MCNC: 9.6 MG/DL (ref 8.5–10.1)
CHLORIDE SERPL-SCNC: 111 MMOL/L (ref 98–112)
CLARITY UR REFRACT.AUTO: CLEAR
CO2 SERPL-SCNC: 27 MMOL/L (ref 21–32)
COLOR UR AUTO: YELLOW
CREAT BLD-MCNC: 2.28 MG/DL
EOSINOPHIL # BLD AUTO: 0.08 X10(3) UL (ref 0–0.7)
EOSINOPHIL NFR BLD AUTO: 1.9 %
ERYTHROCYTE [DISTWIDTH] IN BLOOD BY AUTOMATED COUNT: 14.7 %
FASTING STATUS PATIENT QL REPORTED: YES
GFR SERPLBLD BASED ON 1.73 SQ M-ARVRAT: 24 ML/MIN/1.73M2 (ref 60–?)
GLUCOSE BLD-MCNC: 70 MG/DL (ref 70–99)
GLUCOSE UR STRIP.AUTO-MCNC: NEGATIVE MG/DL
HCT VFR BLD AUTO: 39.3 %
HGB BLD-MCNC: 13.2 G/DL
IMM GRANULOCYTES # BLD AUTO: 0.01 X10(3) UL (ref 0–1)
IMM GRANULOCYTES NFR BLD: 0.2 %
KETONES UR STRIP.AUTO-MCNC: NEGATIVE MG/DL
LEUKOCYTE ESTERASE UR QL STRIP.AUTO: NEGATIVE
LYMPHOCYTES # BLD AUTO: 1.64 X10(3) UL (ref 1–4)
LYMPHOCYTES NFR BLD AUTO: 39.8 %
MCH RBC QN AUTO: 34.2 PG (ref 26–34)
MCHC RBC AUTO-ENTMCNC: 33.6 G/DL (ref 31–37)
MCV RBC AUTO: 101.8 FL
MONOCYTES # BLD AUTO: 0.4 X10(3) UL (ref 0.1–1)
MONOCYTES NFR BLD AUTO: 9.7 %
NEUTROPHILS # BLD AUTO: 1.93 X10 (3) UL (ref 1.5–7.7)
NEUTROPHILS # BLD AUTO: 1.93 X10(3) UL (ref 1.5–7.7)
NEUTROPHILS NFR BLD AUTO: 46.9 %
NITRITE UR QL STRIP.AUTO: NEGATIVE
OSMOLALITY SERPL CALC.SUM OF ELEC: 300 MOSM/KG (ref 275–295)
PH UR STRIP.AUTO: 6.5 [PH] (ref 5–8)
PLATELET # BLD AUTO: 220 10(3)UL (ref 150–450)
POTASSIUM SERPL-SCNC: 3.4 MMOL/L (ref 3.5–5.1)
RBC # BLD AUTO: 3.86 X10(6)UL
RBC UR QL AUTO: NEGATIVE
SODIUM SERPL-SCNC: 142 MMOL/L (ref 136–145)
SP GR UR STRIP.AUTO: 1.02 (ref 1–1.03)
UROBILINOGEN UR STRIP.AUTO-MCNC: 1 MG/DL
WBC # BLD AUTO: 4.1 X10(3) UL (ref 4–11)

## 2022-09-12 PROCEDURE — 81003 URINALYSIS AUTO W/O SCOPE: CPT | Performed by: INTERNAL MEDICINE

## 2022-09-14 ENCOUNTER — OFFICE VISIT (OUTPATIENT)
Dept: NEPHROLOGY | Facility: CLINIC | Age: 62
End: 2022-09-14
Payer: COMMERCIAL

## 2022-09-14 VITALS — WEIGHT: 135 LBS | BODY MASS INDEX: 27 KG/M2 | SYSTOLIC BLOOD PRESSURE: 126 MMHG | DIASTOLIC BLOOD PRESSURE: 72 MMHG

## 2022-09-14 DIAGNOSIS — Z94.0 KIDNEY TRANSPLANT RECIPIENT: Primary | ICD-10-CM

## 2022-09-14 DIAGNOSIS — I10 PRIMARY HYPERTENSION: ICD-10-CM

## 2022-09-14 DIAGNOSIS — R80.9 PROTEINURIA, UNSPECIFIED TYPE: ICD-10-CM

## 2022-09-14 LAB — TACROLIMUS: 5.4 NG/ML

## 2022-09-14 PROCEDURE — 99214 OFFICE O/P EST MOD 30 MIN: CPT | Performed by: INTERNAL MEDICINE

## 2022-09-14 PROCEDURE — 3078F DIAST BP <80 MM HG: CPT | Performed by: INTERNAL MEDICINE

## 2022-09-14 PROCEDURE — 3074F SYST BP LT 130 MM HG: CPT | Performed by: INTERNAL MEDICINE

## 2022-09-14 RX ORDER — WARFARIN SODIUM 2.5 MG/1
2.5 TABLET ORAL AS DIRECTED
Qty: 150 TABLET | Refills: 3 | Status: SHIPPED | OUTPATIENT
Start: 2022-09-14

## 2022-09-14 RX ORDER — PREDNISONE 1 MG/1
5 TABLET ORAL DAILY
Qty: 90 TABLET | Refills: 3 | Status: SHIPPED | OUTPATIENT
Start: 2022-09-14

## 2022-10-14 DIAGNOSIS — N18.4 CKD (CHRONIC KIDNEY DISEASE) STAGE 4, GFR 15-29 ML/MIN (HCC): ICD-10-CM

## 2022-10-14 DIAGNOSIS — Z94.0 KIDNEY TRANSPLANT RECIPIENT: ICD-10-CM

## 2022-10-14 DIAGNOSIS — T86.11 CHRONIC RENAL ALLOGRAFT NEPHROPATHY: ICD-10-CM

## 2022-10-14 DIAGNOSIS — I10 ESSENTIAL HYPERTENSION: ICD-10-CM

## 2022-10-14 RX ORDER — AZATHIOPRINE 50 MG/1
50 TABLET ORAL DAILY
Qty: 90 TABLET | Refills: 1 | Status: SHIPPED | OUTPATIENT
Start: 2022-10-14 | End: 2023-02-09

## 2023-01-25 ENCOUNTER — HOSPITAL ENCOUNTER (EMERGENCY)
Facility: HOSPITAL | Age: 63
Discharge: HOME | End: 2023-01-25
Attending: EMERGENCY MEDICINE
Payer: COMMERCIAL

## 2023-01-25 VITALS
WEIGHT: 130 LBS | TEMPERATURE: 98 F | DIASTOLIC BLOOD PRESSURE: 92 MMHG | OXYGEN SATURATION: 100 % | SYSTOLIC BLOOD PRESSURE: 159 MMHG | HEIGHT: 59 IN | HEART RATE: 66 BPM | RESPIRATION RATE: 16 BRPM | BODY MASS INDEX: 26.21 KG/M2

## 2023-01-25 DIAGNOSIS — U07.1 COVID-19: Primary | ICD-10-CM

## 2023-01-25 LAB — SARS-COV-2 RNA RESP QL NAA+PROBE: DETECTED

## 2023-01-25 PROCEDURE — 99283 EMERGENCY DEPT VISIT LOW MDM: CPT

## 2023-01-25 PROCEDURE — 99284 EMERGENCY DEPT VISIT MOD MDM: CPT

## 2023-01-26 NOTE — ED INITIAL ASSESSMENT (HPI)
Referred by PCP to have antiviral meds due to recent Covid Dx & multiple kidney transplant.  Sx of diarrhea, back pain started Sunday, + Covid by [employer]

## 2023-01-26 NOTE — DISCHARGE INSTRUCTIONS
Take the medication as prescribed. Follow-up with your transplant docs. Return if shortness of breath or worsening symptoms.

## 2023-02-09 ENCOUNTER — TELEPHONE (OUTPATIENT)
Dept: NEPHROLOGY | Facility: CLINIC | Age: 63
End: 2023-02-09

## 2023-02-09 DIAGNOSIS — I10 ESSENTIAL HYPERTENSION: ICD-10-CM

## 2023-02-09 DIAGNOSIS — N18.4 CKD (CHRONIC KIDNEY DISEASE) STAGE 4, GFR 15-29 ML/MIN (HCC): ICD-10-CM

## 2023-02-09 DIAGNOSIS — T86.11 CHRONIC RENAL ALLOGRAFT NEPHROPATHY: ICD-10-CM

## 2023-02-09 DIAGNOSIS — Z94.0 KIDNEY TRANSPLANT RECIPIENT: ICD-10-CM

## 2023-02-09 RX ORDER — PREDNISONE 1 MG/1
5 TABLET ORAL DAILY
Qty: 90 TABLET | Refills: 3 | Status: SHIPPED | OUTPATIENT
Start: 2023-02-09

## 2023-02-09 RX ORDER — AZATHIOPRINE 50 MG/1
50 TABLET ORAL DAILY
Qty: 90 TABLET | Refills: 1 | Status: SHIPPED | OUTPATIENT
Start: 2023-02-09

## 2023-02-09 NOTE — TELEPHONE ENCOUNTER
Patient requesting refills on Prednisone and azathioprine    [facility] [address] ([place]), [place] - [address] [phone], [phone]    LOV: 9/14/22

## 2023-06-25 ENCOUNTER — APPOINTMENT (OUTPATIENT)
Dept: GENERAL RADIOLOGY | Facility: HOSPITAL | Age: 63
End: 2023-06-25
Attending: EMERGENCY MEDICINE
Payer: COMMERCIAL

## 2023-06-25 ENCOUNTER — HOSPITAL ENCOUNTER (EMERGENCY)
Facility: HOSPITAL | Age: 63
Discharge: HOME | End: 2023-06-25
Attending: EMERGENCY MEDICINE
Payer: COMMERCIAL

## 2023-06-25 VITALS
WEIGHT: 136 LBS | SYSTOLIC BLOOD PRESSURE: 151 MMHG | BODY MASS INDEX: 27.42 KG/M2 | DIASTOLIC BLOOD PRESSURE: 78 MMHG | HEART RATE: 76 BPM | TEMPERATURE: 100 F | RESPIRATION RATE: 15 BRPM | OXYGEN SATURATION: 99 % | HEIGHT: 59 IN

## 2023-06-25 DIAGNOSIS — E87.6 HYPOKALEMIA: ICD-10-CM

## 2023-06-25 DIAGNOSIS — M19.90 INFLAMMATORY ARTHRITIS: Primary | ICD-10-CM

## 2023-06-25 DIAGNOSIS — M54.50 LOW BACK PAIN, UNSPECIFIED BACK PAIN LATERALITY, UNSPECIFIED CHRONICITY, UNSPECIFIED WHETHER SCIATICA PRESENT: ICD-10-CM

## 2023-06-25 LAB
ALBUMIN SERPL-MCNC: 3.3 G/DL (ref 3.4–5)
ALBUMIN/GLOB SERPL: 0.8 {RATIO} (ref 1–2)
ALP LIVER SERPL-CCNC: 79 U/L
ALT SERPL-CCNC: 11 U/L
ANION GAP SERPL CALC-SCNC: 7 MMOL/L (ref 0–18)
AST SERPL-CCNC: 18 U/L (ref 15–37)
BASOPHILS # BLD AUTO: 0.02 X10(3) UL (ref 0–0.2)
BASOPHILS NFR BLD AUTO: 0.3 %
BILIRUB SERPL-MCNC: 1 MG/DL (ref 0.1–2)
BUN BLD-MCNC: 20 MG/DL (ref 7–18)
CALCIUM BLD-MCNC: 9.5 MG/DL (ref 8.5–10.1)
CHLORIDE SERPL-SCNC: 102 MMOL/L (ref 98–112)
CO2 SERPL-SCNC: 23 MMOL/L (ref 21–32)
CREAT BLD-MCNC: 1.74 MG/DL
EOSINOPHIL # BLD AUTO: 0.01 X10(3) UL (ref 0–0.7)
EOSINOPHIL NFR BLD AUTO: 0.2 %
ERYTHROCYTE [DISTWIDTH] IN BLOOD BY AUTOMATED COUNT: 13.6 %
ERYTHROCYTE [SEDIMENTATION RATE] IN BLOOD: 77 MM/HR
GFR SERPLBLD BASED ON 1.73 SQ M-ARVRAT: 33 ML/MIN/1.73M2 (ref 60–?)
GLOBULIN PLAS-MCNC: 4.1 G/DL (ref 2.8–4.4)
GLUCOSE BLD-MCNC: 80 MG/DL (ref 70–99)
HCT VFR BLD AUTO: 34.8 %
HGB BLD-MCNC: 12.2 G/DL
IMM GRANULOCYTES # BLD AUTO: 0.03 X10(3) UL (ref 0–1)
IMM GRANULOCYTES NFR BLD: 0.5 %
LYMPHOCYTES # BLD AUTO: 1.03 X10(3) UL (ref 1–4)
LYMPHOCYTES NFR BLD AUTO: 16.4 %
MCH RBC QN AUTO: 34.2 PG (ref 26–34)
MCHC RBC AUTO-ENTMCNC: 35.1 G/DL (ref 31–37)
MCV RBC AUTO: 97.5 FL
MONOCYTES # BLD AUTO: 1 X10(3) UL (ref 0.1–1)
MONOCYTES NFR BLD AUTO: 15.9 %
NEUTROPHILS # BLD AUTO: 4.2 X10 (3) UL (ref 1.5–7.7)
NEUTROPHILS # BLD AUTO: 4.2 X10(3) UL (ref 1.5–7.7)
NEUTROPHILS NFR BLD AUTO: 66.7 %
OSMOLALITY SERPL CALC.SUM OF ELEC: 276 MOSM/KG (ref 275–295)
PLATELET # BLD AUTO: 216 10(3)UL (ref 150–450)
POTASSIUM SERPL-SCNC: 3.3 MMOL/L (ref 3.5–5.1)
PROT SERPL-MCNC: 7.4 G/DL (ref 6.4–8.2)
RBC # BLD AUTO: 3.57 X10(6)UL
SODIUM SERPL-SCNC: 132 MMOL/L (ref 136–145)
WBC # BLD AUTO: 6.3 X10(3) UL (ref 4–11)

## 2023-06-25 PROCEDURE — 85025 COMPLETE CBC W/AUTO DIFF WBC: CPT | Performed by: EMERGENCY MEDICINE

## 2023-06-25 PROCEDURE — 85025 COMPLETE CBC W/AUTO DIFF WBC: CPT

## 2023-06-25 PROCEDURE — 73130 X-RAY EXAM OF HAND: CPT | Performed by: EMERGENCY MEDICINE

## 2023-06-25 PROCEDURE — 99285 EMERGENCY DEPT VISIT HI MDM: CPT

## 2023-06-25 PROCEDURE — 36415 COLL VENOUS BLD VENIPUNCTURE: CPT

## 2023-06-25 PROCEDURE — 80053 COMPREHEN METABOLIC PANEL: CPT

## 2023-06-25 PROCEDURE — 85652 RBC SED RATE AUTOMATED: CPT | Performed by: EMERGENCY MEDICINE

## 2023-06-25 PROCEDURE — 73110 X-RAY EXAM OF WRIST: CPT | Performed by: EMERGENCY MEDICINE

## 2023-06-25 PROCEDURE — 99284 EMERGENCY DEPT VISIT MOD MDM: CPT

## 2023-06-25 PROCEDURE — 80053 COMPREHEN METABOLIC PANEL: CPT | Performed by: EMERGENCY MEDICINE

## 2023-06-25 RX ORDER — METHYLPREDNISOLONE 4 MG/1
TABLET ORAL
Qty: 21 TABLET | Refills: 0 | Status: SHIPPED | OUTPATIENT
Start: 2023-06-25

## 2023-06-25 RX ORDER — HYDROCODONE BITARTRATE AND ACETAMINOPHEN 5; 325 MG/1; MG/1
1-2 TABLET ORAL EVERY 6 HOURS PRN
Qty: 10 TABLET | Refills: 0 | Status: SHIPPED | OUTPATIENT
Start: 2023-06-25 | End: 2023-06-30

## 2023-06-25 RX ORDER — HYDROCODONE BITARTRATE AND ACETAMINOPHEN 5; 325 MG/1; MG/1
2 TABLET ORAL ONCE
Status: COMPLETED | OUTPATIENT
Start: 2023-06-25 | End: 2023-06-25

## 2023-06-25 NOTE — ED INITIAL ASSESSMENT (HPI)
PT reports L hand swelling since Thursday, seen at [facility], and was discharged. PT also c/o L lower back pain since Wednesday. PT went to physical therapy on Wednesday, and pain got worse on Thursday.  Denies trauma or fever

## 2023-06-25 NOTE — ED QUICK NOTES
Redness noted to left 4th digit and left hand. Pt reports swelling started on Thursday in left 3rd digit and now has spread. States she was seen at urgent care and was told it was arthritis and was given tramadol no relief.

## 2023-06-25 NOTE — DISCHARGE INSTRUCTIONS
Please follow-up with your primary care physician 1-2 days return to the ER if your symptoms worsen progress or if you have any further concerns. Please start the Medrol Dosepak today. Please take the Norco as prescribed as needed for severe pain do not take Norco and drive or operate heavy machinery as it may make you drowsy. Return to the ER if your develop redness going up the arm, or worsening swelling/redness, or if your pain worsens or if you develop fever.

## 2023-08-14 DIAGNOSIS — Z94.0 KIDNEY TRANSPLANT RECIPIENT: ICD-10-CM

## 2023-08-14 DIAGNOSIS — N18.4 CKD (CHRONIC KIDNEY DISEASE) STAGE 4, GFR 15-29 ML/MIN (HCC): ICD-10-CM

## 2023-08-14 DIAGNOSIS — T86.11 CHRONIC RENAL ALLOGRAFT NEPHROPATHY: ICD-10-CM

## 2023-08-14 DIAGNOSIS — I10 ESSENTIAL HYPERTENSION: ICD-10-CM

## 2023-08-14 RX ORDER — TACROLIMUS 1 MG/1
2 CAPSULE ORAL 2 TIMES DAILY
Qty: 120 CAPSULE | Refills: 0 | Status: SHIPPED | OUTPATIENT
Start: 2023-08-14

## 2023-09-04 DIAGNOSIS — I10 ESSENTIAL HYPERTENSION: ICD-10-CM

## 2023-09-04 DIAGNOSIS — T86.11 CHRONIC RENAL ALLOGRAFT NEPHROPATHY: ICD-10-CM

## 2023-09-04 DIAGNOSIS — N18.4 CKD (CHRONIC KIDNEY DISEASE) STAGE 4, GFR 15-29 ML/MIN (HCC): ICD-10-CM

## 2023-09-04 DIAGNOSIS — Z94.0 KIDNEY TRANSPLANT RECIPIENT: ICD-10-CM

## 2023-09-05 RX ORDER — TACROLIMUS 1 MG/1
2 CAPSULE ORAL 2 TIMES DAILY
Qty: 120 CAPSULE | Refills: 0 | Status: SHIPPED | OUTPATIENT
Start: 2023-09-05

## 2023-09-14 RX ORDER — WARFARIN SODIUM 2.5 MG/1
TABLET ORAL
Qty: 150 TABLET | Refills: 0 | OUTPATIENT
Start: 2023-09-14

## 2023-09-14 RX ORDER — WARFARIN SODIUM 2.5 MG/1
TABLET ORAL
Qty: 48 TABLET | Refills: 0 | Status: SHIPPED | OUTPATIENT
Start: 2023-09-14

## 2023-10-02 DIAGNOSIS — Z94.0 STATUS POST KIDNEY TRANSPLANT: Primary | ICD-10-CM

## 2023-10-03 DIAGNOSIS — I10 ESSENTIAL HYPERTENSION: ICD-10-CM

## 2023-10-03 DIAGNOSIS — T86.11 CHRONIC RENAL ALLOGRAFT NEPHROPATHY: ICD-10-CM

## 2023-10-03 DIAGNOSIS — Z94.0 KIDNEY TRANSPLANT RECIPIENT: ICD-10-CM

## 2023-10-03 DIAGNOSIS — N18.4 CKD (CHRONIC KIDNEY DISEASE) STAGE 4, GFR 15-29 ML/MIN (HCC): ICD-10-CM

## 2023-10-03 RX ORDER — TACROLIMUS 1 MG/1
2 CAPSULE ORAL 2 TIMES DAILY
Qty: 56 CAPSULE | Refills: 0 | Status: SHIPPED | OUTPATIENT
Start: 2023-10-03

## 2023-10-03 RX ORDER — WARFARIN SODIUM 2.5 MG/1
TABLET ORAL
Qty: 48 TABLET | Refills: 0 | Status: SHIPPED | OUTPATIENT
Start: 2023-10-03

## 2023-10-12 DIAGNOSIS — T86.11 CHRONIC RENAL ALLOGRAFT NEPHROPATHY: ICD-10-CM

## 2023-10-12 DIAGNOSIS — Z94.0 KIDNEY TRANSPLANT RECIPIENT: ICD-10-CM

## 2023-10-12 DIAGNOSIS — N18.4 CKD (CHRONIC KIDNEY DISEASE) STAGE 4, GFR 15-29 ML/MIN (HCC): ICD-10-CM

## 2023-10-12 DIAGNOSIS — I10 ESSENTIAL HYPERTENSION: ICD-10-CM

## 2023-10-12 RX ORDER — AZATHIOPRINE 50 MG/1
50 TABLET ORAL DAILY
Qty: 30 TABLET | Refills: 0 | Status: SHIPPED | OUTPATIENT
Start: 2023-10-12

## 2023-10-16 ENCOUNTER — LAB ENCOUNTER (OUTPATIENT)
Dept: LAB | Age: 63
End: 2023-10-16
Attending: INTERNAL MEDICINE
Payer: COMMERCIAL

## 2023-10-16 DIAGNOSIS — Z94.0 STATUS POST KIDNEY TRANSPLANT: ICD-10-CM

## 2023-10-16 LAB
ANION GAP SERPL CALC-SCNC: 5 MMOL/L (ref 0–18)
BASOPHILS # BLD AUTO: 0.02 X10(3) UL (ref 0–0.2)
BASOPHILS NFR BLD AUTO: 0.6 %
BILIRUB UR QL STRIP.AUTO: NEGATIVE
BUN BLD-MCNC: 30 MG/DL (ref 7–18)
CALCIUM BLD-MCNC: 9 MG/DL (ref 8.5–10.1)
CHLORIDE SERPL-SCNC: 111 MMOL/L (ref 98–112)
CLARITY UR REFRACT.AUTO: CLEAR
CO2 SERPL-SCNC: 26 MMOL/L (ref 21–32)
COLOR UR AUTO: YELLOW
CREAT BLD-MCNC: 2.38 MG/DL
EGFRCR SERPLBLD CKD-EPI 2021: 22 ML/MIN/1.73M2 (ref 60–?)
EOSINOPHIL # BLD AUTO: 0.04 X10(3) UL (ref 0–0.7)
EOSINOPHIL NFR BLD AUTO: 1.2 %
ERYTHROCYTE [DISTWIDTH] IN BLOOD BY AUTOMATED COUNT: 14.6 %
FASTING STATUS PATIENT QL REPORTED: YES
GLUCOSE BLD-MCNC: 107 MG/DL (ref 70–99)
GLUCOSE UR STRIP.AUTO-MCNC: 50 MG/DL
HCT VFR BLD AUTO: 35.9 %
HGB BLD-MCNC: 12.3 G/DL
IMM GRANULOCYTES # BLD AUTO: 0.01 X10(3) UL (ref 0–1)
IMM GRANULOCYTES NFR BLD: 0.3 %
KETONES UR STRIP.AUTO-MCNC: NEGATIVE MG/DL
LEUKOCYTE ESTERASE UR QL STRIP.AUTO: NEGATIVE
LYMPHOCYTES # BLD AUTO: 1.57 X10(3) UL (ref 1–4)
LYMPHOCYTES NFR BLD AUTO: 45.9 %
MCH RBC QN AUTO: 34.2 PG (ref 26–34)
MCHC RBC AUTO-ENTMCNC: 34.3 G/DL (ref 31–37)
MCV RBC AUTO: 99.7 FL
MONOCYTES # BLD AUTO: 0.42 X10(3) UL (ref 0.1–1)
MONOCYTES NFR BLD AUTO: 12.3 %
NEUTROPHILS # BLD AUTO: 1.36 X10 (3) UL (ref 1.5–7.7)
NEUTROPHILS # BLD AUTO: 1.36 X10(3) UL (ref 1.5–7.7)
NEUTROPHILS NFR BLD AUTO: 39.7 %
NITRITE UR QL STRIP.AUTO: NEGATIVE
OSMOLALITY SERPL CALC.SUM OF ELEC: 301 MOSM/KG (ref 275–295)
PH UR STRIP.AUTO: 6 [PH] (ref 5–8)
PLATELET # BLD AUTO: 216 10(3)UL (ref 150–450)
POTASSIUM SERPL-SCNC: 3.6 MMOL/L (ref 3.5–5.1)
PROT UR STRIP.AUTO-MCNC: 20 MG/DL
RBC # BLD AUTO: 3.6 X10(6)UL
RBC UR QL AUTO: NEGATIVE
SODIUM SERPL-SCNC: 142 MMOL/L (ref 136–145)
SP GR UR STRIP.AUTO: 1.02 (ref 1–1.03)
UROBILINOGEN UR STRIP.AUTO-MCNC: NORMAL MG/DL
WBC # BLD AUTO: 3.4 X10(3) UL (ref 4–11)

## 2023-10-16 PROCEDURE — 80048 BASIC METABOLIC PNL TOTAL CA: CPT

## 2023-10-16 PROCEDURE — 85025 COMPLETE CBC W/AUTO DIFF WBC: CPT

## 2023-10-16 PROCEDURE — 36415 COLL VENOUS BLD VENIPUNCTURE: CPT

## 2023-10-16 PROCEDURE — 80197 ASSAY OF TACROLIMUS: CPT

## 2023-10-16 PROCEDURE — 81001 URINALYSIS AUTO W/SCOPE: CPT

## 2023-10-18 ENCOUNTER — OFFICE VISIT (OUTPATIENT)
Dept: NEPHROLOGY | Facility: CLINIC | Age: 63
End: 2023-10-18
Payer: COMMERCIAL

## 2023-10-18 ENCOUNTER — NURSE ONLY (OUTPATIENT)
Dept: NEPHROLOGY | Facility: CLINIC | Age: 63
End: 2023-10-18

## 2023-10-18 VITALS — BODY MASS INDEX: 28 KG/M2 | WEIGHT: 137 LBS | SYSTOLIC BLOOD PRESSURE: 136 MMHG | DIASTOLIC BLOOD PRESSURE: 70 MMHG

## 2023-10-18 DIAGNOSIS — I10 PRIMARY HYPERTENSION: ICD-10-CM

## 2023-10-18 DIAGNOSIS — N18.4 CKD (CHRONIC KIDNEY DISEASE) STAGE 4, GFR 15-29 ML/MIN (HCC): Primary | ICD-10-CM

## 2023-10-18 DIAGNOSIS — Z94.0 KIDNEY TRANSPLANT RECIPIENT: ICD-10-CM

## 2023-10-18 LAB — TACROLIMUS LVL: 5.5 NG/ML

## 2023-10-18 PROCEDURE — 3078F DIAST BP <80 MM HG: CPT | Performed by: INTERNAL MEDICINE

## 2023-10-18 PROCEDURE — 99214 OFFICE O/P EST MOD 30 MIN: CPT | Performed by: INTERNAL MEDICINE

## 2023-10-18 PROCEDURE — 3075F SYST BP GE 130 - 139MM HG: CPT | Performed by: INTERNAL MEDICINE

## 2023-10-18 RX ORDER — INSULIN GLARGINE 100 [IU]/ML
20 INJECTION, SOLUTION SUBCUTANEOUS NIGHTLY
COMMUNITY

## 2023-10-31 ENCOUNTER — HOSPITAL ENCOUNTER (EMERGENCY)
Facility: HOSPITAL | Age: 63
Discharge: HOME | End: 2023-10-31
Attending: EMERGENCY MEDICINE
Payer: COMMERCIAL

## 2023-10-31 ENCOUNTER — APPOINTMENT (OUTPATIENT)
Dept: GENERAL RADIOLOGY | Facility: HOSPITAL | Age: 63
End: 2023-10-31
Attending: EMERGENCY MEDICINE
Payer: COMMERCIAL

## 2023-10-31 VITALS
OXYGEN SATURATION: 100 % | RESPIRATION RATE: 18 BRPM | TEMPERATURE: 97 F | HEART RATE: 86 BPM | DIASTOLIC BLOOD PRESSURE: 88 MMHG | WEIGHT: 136 LBS | SYSTOLIC BLOOD PRESSURE: 158 MMHG | BODY MASS INDEX: 27 KG/M2

## 2023-10-31 DIAGNOSIS — M62.838 NECK MUSCLE SPASM: ICD-10-CM

## 2023-10-31 DIAGNOSIS — M54.12 CERVICAL RADICULOPATHY: Primary | ICD-10-CM

## 2023-10-31 PROCEDURE — 72050 X-RAY EXAM NECK SPINE 4/5VWS: CPT | Performed by: EMERGENCY MEDICINE

## 2023-10-31 PROCEDURE — 99283 EMERGENCY DEPT VISIT LOW MDM: CPT

## 2023-10-31 PROCEDURE — 99284 EMERGENCY DEPT VISIT MOD MDM: CPT

## 2023-10-31 RX ORDER — CYCLOBENZAPRINE HCL 10 MG
5 TABLET ORAL 3 TIMES DAILY PRN
Qty: 20 TABLET | Refills: 0 | Status: ON HOLD | OUTPATIENT
Start: 2023-10-31 | End: 2023-11-03

## 2023-10-31 RX ORDER — ACETAMINOPHEN 500 MG
1000 TABLET ORAL ONCE
Status: COMPLETED | OUTPATIENT
Start: 2023-10-31 | End: 2023-10-31

## 2023-10-31 RX ORDER — ACETAMINOPHEN 500 MG
TABLET ORAL
Status: COMPLETED
Start: 2023-10-31 | End: 2023-10-31

## 2023-10-31 NOTE — ED INITIAL ASSESSMENT (HPI)
Pt presents to ed ambulatory with steady gait c/o neck pain at a 10/10 that radiates to [place], and down back to rle, pt denies injury, states pain started 3 days ago

## 2023-11-02 DIAGNOSIS — N18.4 CKD (CHRONIC KIDNEY DISEASE) STAGE 4, GFR 15-29 ML/MIN (HCC): ICD-10-CM

## 2023-11-02 DIAGNOSIS — Z94.0 KIDNEY TRANSPLANT RECIPIENT: ICD-10-CM

## 2023-11-02 DIAGNOSIS — I10 ESSENTIAL HYPERTENSION: ICD-10-CM

## 2023-11-02 DIAGNOSIS — T86.11 CHRONIC RENAL ALLOGRAFT NEPHROPATHY: ICD-10-CM

## 2023-11-02 RX ORDER — TACROLIMUS 1 MG/1
2 CAPSULE ORAL 2 TIMES DAILY
Qty: 360 CAPSULE | Refills: 1 | Status: SHIPPED | OUTPATIENT
Start: 2023-11-02

## 2023-11-03 ENCOUNTER — APPOINTMENT (OUTPATIENT)
Dept: GENERAL RADIOLOGY | Facility: HOSPITAL | Age: 63
End: 2023-11-03
Attending: EMERGENCY MEDICINE
Payer: COMMERCIAL

## 2023-11-03 ENCOUNTER — HOSPITAL ENCOUNTER (INPATIENT)
Facility: HOSPITAL | Age: 63
LOS: 2 days | Discharge: HOME | End: 2023-11-05
Attending: EMERGENCY MEDICINE | Admitting: HOSPITALIST
Payer: COMMERCIAL

## 2023-11-03 ENCOUNTER — APPOINTMENT (OUTPATIENT)
Dept: GENERAL RADIOLOGY | Facility: HOSPITAL | Age: 63
DRG: 638 | End: 2023-11-03
Attending: EMERGENCY MEDICINE
Payer: COMMERCIAL

## 2023-11-03 ENCOUNTER — HOSPITAL ENCOUNTER (INPATIENT)
Facility: HOSPITAL | Age: 63
LOS: 2 days | Discharge: HOME | DRG: 638 | End: 2023-11-05
Attending: EMERGENCY MEDICINE | Admitting: HOSPITALIST
Payer: COMMERCIAL

## 2023-11-03 DIAGNOSIS — E11.65 TYPE 2 DIABETES MELLITUS WITH HYPERGLYCEMIA, WITH LONG-TERM CURRENT USE OF INSULIN (HCC): Primary | ICD-10-CM

## 2023-11-03 DIAGNOSIS — Z79.4 TYPE 2 DIABETES MELLITUS WITH HYPERGLYCEMIA, WITH LONG-TERM CURRENT USE OF INSULIN (HCC): Primary | ICD-10-CM

## 2023-11-03 PROBLEM — N17.9 ACUTE KIDNEY INJURY (HCC): Status: ACTIVE | Noted: 2023-11-03

## 2023-11-03 PROBLEM — R73.9 HYPERGLYCEMIA: Status: ACTIVE | Noted: 2023-11-03

## 2023-11-03 PROBLEM — E87.1 HYPONATREMIA: Status: ACTIVE | Noted: 2023-11-03

## 2023-11-03 LAB
ALBUMIN SERPL-MCNC: 3.2 G/DL (ref 3.4–5)
ALBUMIN/GLOB SERPL: 0.7 {RATIO} (ref 1–2)
ALP LIVER SERPL-CCNC: 117 U/L
ALT SERPL-CCNC: 13 U/L
ANION GAP SERPL CALC-SCNC: 9 MMOL/L (ref 0–18)
AST SERPL-CCNC: 10 U/L (ref 15–37)
ATRIAL RATE: 72 BPM
BASOPHILS # BLD AUTO: 0.01 X10(3) UL (ref 0–0.2)
BASOPHILS NFR BLD AUTO: 0.1 %
BILIRUB SERPL-MCNC: 0.6 MG/DL (ref 0.1–2)
BILIRUB UR QL STRIP.AUTO: NEGATIVE
BUN BLD-MCNC: 44 MG/DL (ref 9–23)
CALCIUM BLD-MCNC: 9.9 MG/DL (ref 8.5–10.1)
CHLORIDE SERPL-SCNC: 92 MMOL/L (ref 98–112)
CLARITY UR REFRACT.AUTO: CLEAR
CO2 SERPL-SCNC: 22 MMOL/L (ref 21–32)
CREAT BLD-MCNC: 2.98 MG/DL
EGFRCR SERPLBLD CKD-EPI 2021: 17 ML/MIN/1.73M2 (ref 60–?)
EOSINOPHIL # BLD AUTO: 0 X10(3) UL (ref 0–0.7)
EOSINOPHIL NFR BLD AUTO: 0 %
ERYTHROCYTE [DISTWIDTH] IN BLOOD BY AUTOMATED COUNT: 13.2 %
GLOBULIN PLAS-MCNC: 4.6 G/DL (ref 2.8–4.4)
GLUCOSE BLD-MCNC: 143 MG/DL (ref 70–99)
GLUCOSE BLD-MCNC: 156 MG/DL (ref 70–99)
GLUCOSE BLD-MCNC: 414 MG/DL (ref 70–99)
GLUCOSE BLD-MCNC: 449 MG/DL (ref 70–99)
GLUCOSE BLD-MCNC: 490 MG/DL (ref 70–99)
GLUCOSE BLD-MCNC: 494 MG/DL (ref 70–99)
GLUCOSE UR STRIP.AUTO-MCNC: >1000 MG/DL
HCT VFR BLD AUTO: 35.7 %
HGB BLD-MCNC: 12.7 G/DL
IMM GRANULOCYTES # BLD AUTO: 0.06 X10(3) UL (ref 0–1)
IMM GRANULOCYTES NFR BLD: 0.7 %
KETONES UR STRIP.AUTO-MCNC: NEGATIVE MG/DL
LEUKOCYTE ESTERASE UR QL STRIP.AUTO: NEGATIVE
LYMPHOCYTES # BLD AUTO: 0.27 X10(3) UL (ref 1–4)
LYMPHOCYTES NFR BLD AUTO: 3.3 %
MCH RBC QN AUTO: 33.9 PG (ref 26–34)
MCHC RBC AUTO-ENTMCNC: 35.6 G/DL (ref 31–37)
MCV RBC AUTO: 95.2 FL
MONOCYTES # BLD AUTO: 0.38 X10(3) UL (ref 0.1–1)
MONOCYTES NFR BLD AUTO: 4.6 %
NEUTROPHILS # BLD AUTO: 7.49 X10 (3) UL (ref 1.5–7.7)
NEUTROPHILS # BLD AUTO: 7.49 X10(3) UL (ref 1.5–7.7)
NEUTROPHILS NFR BLD AUTO: 91.3 %
NITRITE UR QL STRIP.AUTO: NEGATIVE
OSMOLALITY SERPL CALC.SUM OF ELEC: 289 MOSM/KG (ref 275–295)
P AXIS: 21 DEGREES
P-R INTERVAL: 140 MS
PH UR STRIP.AUTO: 5.5 [PH] (ref 5–8)
PLATELET # BLD AUTO: 323 10(3)UL (ref 150–450)
POTASSIUM SERPL-SCNC: 4 MMOL/L (ref 3.5–5.1)
PROT SERPL-MCNC: 7.8 G/DL (ref 6.4–8.2)
PROT UR STRIP.AUTO-MCNC: NEGATIVE MG/DL
Q-T INTERVAL: 402 MS
QRS DURATION: 96 MS
QTC CALCULATION (BEZET): 440 MS
R AXIS: -13 DEGREES
RBC # BLD AUTO: 3.75 X10(6)UL
RBC UR QL AUTO: NEGATIVE
SODIUM SERPL-SCNC: 123 MMOL/L (ref 136–145)
SP GR UR STRIP.AUTO: 1.01 (ref 1–1.03)
T AXIS: 127 DEGREES
UROBILINOGEN UR STRIP.AUTO-MCNC: NORMAL MG/DL
VENTRICULAR RATE: 72 BPM
WBC # BLD AUTO: 8.2 X10(3) UL (ref 4–11)

## 2023-11-03 PROCEDURE — 93010 ELECTROCARDIOGRAM REPORT: CPT

## 2023-11-03 PROCEDURE — 99285 EMERGENCY DEPT VISIT HI MDM: CPT

## 2023-11-03 PROCEDURE — 85025 COMPLETE CBC W/AUTO DIFF WBC: CPT | Performed by: EMERGENCY MEDICINE

## 2023-11-03 PROCEDURE — 80053 COMPREHEN METABOLIC PANEL: CPT | Performed by: EMERGENCY MEDICINE

## 2023-11-03 PROCEDURE — 85025 COMPLETE CBC W/AUTO DIFF WBC: CPT

## 2023-11-03 PROCEDURE — 93005 ELECTROCARDIOGRAM TRACING: CPT

## 2023-11-03 PROCEDURE — 82962 GLUCOSE BLOOD TEST: CPT

## 2023-11-03 PROCEDURE — 83036 HEMOGLOBIN GLYCOSYLATED A1C: CPT | Performed by: HOSPITALIST

## 2023-11-03 PROCEDURE — 71046 X-RAY EXAM CHEST 2 VIEWS: CPT | Performed by: EMERGENCY MEDICINE

## 2023-11-03 PROCEDURE — 80053 COMPREHEN METABOLIC PANEL: CPT

## 2023-11-03 PROCEDURE — 81003 URINALYSIS AUTO W/O SCOPE: CPT | Performed by: EMERGENCY MEDICINE

## 2023-11-03 PROCEDURE — 96361 HYDRATE IV INFUSION ADD-ON: CPT

## 2023-11-03 PROCEDURE — 96360 HYDRATION IV INFUSION INIT: CPT

## 2023-11-03 RX ORDER — SENNOSIDES 8.6 MG
17.2 TABLET ORAL NIGHTLY PRN
Status: DISCONTINUED | OUTPATIENT
Start: 2023-11-03 | End: 2023-11-05

## 2023-11-03 RX ORDER — ONDANSETRON 2 MG/ML
4 INJECTION INTRAMUSCULAR; INTRAVENOUS EVERY 6 HOURS PRN
Status: DISCONTINUED | OUTPATIENT
Start: 2023-11-03 | End: 2023-11-05

## 2023-11-03 RX ORDER — PROCHLORPERAZINE EDISYLATE 5 MG/ML
5 INJECTION INTRAMUSCULAR; INTRAVENOUS EVERY 8 HOURS PRN
Status: DISCONTINUED | OUTPATIENT
Start: 2023-11-03 | End: 2023-11-05

## 2023-11-03 RX ORDER — NICOTINE POLACRILEX 4 MG
30 LOZENGE BUCCAL
Status: DISCONTINUED | OUTPATIENT
Start: 2023-11-03 | End: 2023-11-05

## 2023-11-03 RX ORDER — ACETAMINOPHEN 500 MG
1000 TABLET ORAL EVERY 8 HOURS PRN
Status: DISCONTINUED | OUTPATIENT
Start: 2023-11-03 | End: 2023-11-05

## 2023-11-03 RX ORDER — ACETAMINOPHEN 500 MG
500 TABLET ORAL EVERY 4 HOURS PRN
Status: DISCONTINUED | OUTPATIENT
Start: 2023-11-03 | End: 2023-11-03

## 2023-11-03 RX ORDER — NICOTINE POLACRILEX 4 MG
15 LOZENGE BUCCAL
Status: CANCELLED | OUTPATIENT
Start: 2023-11-03

## 2023-11-03 RX ORDER — NICOTINE POLACRILEX 4 MG
30 LOZENGE BUCCAL
Status: CANCELLED | OUTPATIENT
Start: 2023-11-03

## 2023-11-03 RX ORDER — SODIUM CHLORIDE 9 MG/ML
INJECTION, SOLUTION INTRAVENOUS CONTINUOUS
Status: DISCONTINUED | OUTPATIENT
Start: 2023-11-03 | End: 2023-11-04

## 2023-11-03 RX ORDER — DEXTROSE MONOHYDRATE 25 G/50ML
50 INJECTION, SOLUTION INTRAVENOUS
Status: CANCELLED | OUTPATIENT
Start: 2023-11-03

## 2023-11-03 RX ORDER — HEPARIN SODIUM 5000 [USP'U]/ML
5000 INJECTION, SOLUTION INTRAVENOUS; SUBCUTANEOUS EVERY 8 HOURS SCHEDULED
Status: DISCONTINUED | OUTPATIENT
Start: 2023-11-03 | End: 2023-11-04

## 2023-11-03 RX ORDER — NICOTINE POLACRILEX 4 MG
15 LOZENGE BUCCAL
Status: DISCONTINUED | OUTPATIENT
Start: 2023-11-03 | End: 2023-11-05

## 2023-11-03 RX ORDER — DEXTROSE MONOHYDRATE 25 G/50ML
50 INJECTION, SOLUTION INTRAVENOUS
Status: DISCONTINUED | OUTPATIENT
Start: 2023-11-03 | End: 2023-11-05

## 2023-11-03 RX ORDER — SODIUM CHLORIDE 9 MG/ML
INJECTION, SOLUTION INTRAVENOUS CONTINUOUS
Status: DISCONTINUED | OUTPATIENT
Start: 2023-11-03 | End: 2023-11-05

## 2023-11-03 RX ORDER — POLYETHYLENE GLYCOL 3350 17 G/17G
17 POWDER, FOR SOLUTION ORAL DAILY PRN
Status: DISCONTINUED | OUTPATIENT
Start: 2023-11-03 | End: 2023-11-05

## 2023-11-03 RX ORDER — INSULIN ASPART 100 [IU]/ML
0.2 INJECTION, SOLUTION INTRAVENOUS; SUBCUTANEOUS ONCE
Status: COMPLETED | OUTPATIENT
Start: 2023-11-03 | End: 2023-11-03

## 2023-11-03 NOTE — ED QUICK NOTES
Orders for admission, patient is aware of plan and ready to go upstairs.  Any questions, please call ED RN Marcela Dixon at extension [phone]      Titratable drug(s) infusing:   Rate: n/a    LOC at time of transport: A&O x4    Other pertinent information:     CIWA score=  NIH score=

## 2023-11-03 NOTE — ED INITIAL ASSESSMENT (HPI)
Patient to ER d/t elevated blood sugar readings at home. 600's today. Started on po steroid pack this week.

## 2023-11-04 LAB
ANION GAP SERPL CALC-SCNC: 7 MMOL/L (ref 0–18)
BASOPHILS # BLD AUTO: 0.01 X10(3) UL (ref 0–0.2)
BASOPHILS NFR BLD AUTO: 0.2 %
BUN BLD-MCNC: 40 MG/DL (ref 9–23)
CALCIUM BLD-MCNC: 9.2 MG/DL (ref 8.5–10.1)
CHLORIDE SERPL-SCNC: 111 MMOL/L (ref 98–112)
CO2 SERPL-SCNC: 21 MMOL/L (ref 21–32)
CREAT BLD-MCNC: 1.97 MG/DL
EGFRCR SERPLBLD CKD-EPI 2021: 28 ML/MIN/1.73M2 (ref 60–?)
EOSINOPHIL # BLD AUTO: 0 X10(3) UL (ref 0–0.7)
EOSINOPHIL NFR BLD AUTO: 0 %
ERYTHROCYTE [DISTWIDTH] IN BLOOD BY AUTOMATED COUNT: 12.9 %
EST. AVERAGE GLUCOSE BLD GHB EST-MCNC: 174 MG/DL (ref 68–126)
GLUCOSE BLD-MCNC: 100 MG/DL (ref 70–99)
GLUCOSE BLD-MCNC: 105 MG/DL (ref 70–99)
GLUCOSE BLD-MCNC: 129 MG/DL (ref 70–99)
GLUCOSE BLD-MCNC: 160 MG/DL (ref 70–99)
GLUCOSE BLD-MCNC: 314 MG/DL (ref 70–99)
HBA1C MFR BLD: 7.7 % (ref ?–5.7)
HCT VFR BLD AUTO: 30.8 %
HGB BLD-MCNC: 11.1 G/DL
IMM GRANULOCYTES # BLD AUTO: 0.03 X10(3) UL (ref 0–1)
IMM GRANULOCYTES NFR BLD: 0.5 %
LYMPHOCYTES # BLD AUTO: 0.67 X10(3) UL (ref 1–4)
LYMPHOCYTES NFR BLD AUTO: 10.8 %
MAGNESIUM SERPL-MCNC: 2.2 MG/DL (ref 1.6–2.6)
MCH RBC QN AUTO: 34 PG (ref 26–34)
MCHC RBC AUTO-ENTMCNC: 36 G/DL (ref 31–37)
MCV RBC AUTO: 94.5 FL
MONOCYTES # BLD AUTO: 0.35 X10(3) UL (ref 0.1–1)
MONOCYTES NFR BLD AUTO: 5.6 %
NEUTROPHILS # BLD AUTO: 5.16 X10 (3) UL (ref 1.5–7.7)
NEUTROPHILS # BLD AUTO: 5.16 X10(3) UL (ref 1.5–7.7)
NEUTROPHILS NFR BLD AUTO: 82.9 %
OSMOLALITY SERPL CALC.SUM OF ELEC: 298 MOSM/KG (ref 275–295)
PLATELET # BLD AUTO: 300 10(3)UL (ref 150–450)
POTASSIUM SERPL-SCNC: 3.6 MMOL/L (ref 3.5–5.1)
RBC # BLD AUTO: 3.26 X10(6)UL
SODIUM SERPL-SCNC: 139 MMOL/L (ref 136–145)
WBC # BLD AUTO: 6.2 X10(3) UL (ref 4–11)

## 2023-11-04 PROCEDURE — 83735 ASSAY OF MAGNESIUM: CPT | Performed by: HOSPITALIST

## 2023-11-04 PROCEDURE — 82962 GLUCOSE BLOOD TEST: CPT

## 2023-11-04 PROCEDURE — 85025 COMPLETE CBC W/AUTO DIFF WBC: CPT | Performed by: HOSPITALIST

## 2023-11-04 PROCEDURE — 80048 BASIC METABOLIC PNL TOTAL CA: CPT | Performed by: HOSPITALIST

## 2023-11-04 RX ORDER — AZATHIOPRINE 50 MG/1
50 TABLET ORAL DAILY
Status: DISCONTINUED | OUTPATIENT
Start: 2023-11-04 | End: 2023-11-05

## 2023-11-04 RX ORDER — TACROLIMUS 1 MG/1
2 CAPSULE ORAL 2 TIMES DAILY
Status: DISCONTINUED | OUTPATIENT
Start: 2023-11-04 | End: 2023-11-05

## 2023-11-04 RX ORDER — PREDNISONE 5 MG/1
5 TABLET ORAL
Status: DISCONTINUED | OUTPATIENT
Start: 2023-11-04 | End: 2023-11-05

## 2023-11-04 RX ORDER — ATORVASTATIN CALCIUM 10 MG/1
10 TABLET, FILM COATED ORAL NIGHTLY
Status: DISCONTINUED | OUTPATIENT
Start: 2023-11-04 | End: 2023-11-05

## 2023-11-04 RX ORDER — WARFARIN SODIUM 2.5 MG/1
2.5 TABLET ORAL
Status: DISCONTINUED | OUTPATIENT
Start: 2023-11-07 | End: 2023-11-05

## 2023-11-04 RX ORDER — WARFARIN SODIUM 2.5 MG/1
2.5 TABLET ORAL NIGHTLY
Status: DISCONTINUED | OUTPATIENT
Start: 2023-11-04 | End: 2023-11-04 | Stop reason: CLARIF

## 2023-11-04 RX ORDER — AMLODIPINE BESYLATE 5 MG/1
10 TABLET ORAL DAILY
Status: DISCONTINUED | OUTPATIENT
Start: 2023-11-04 | End: 2023-11-05

## 2023-11-04 RX ORDER — WARFARIN SODIUM 5 MG/1
5 TABLET ORAL
Status: DISCONTINUED | OUTPATIENT
Start: 2023-11-04 | End: 2023-11-05

## 2023-11-04 NOTE — PLAN OF CARE
Patient is alert and orientated x4, VSS, RA, afebrile and does not complain of pain. Patient has 83 mL/hr NS infusing. All meds given per [facility]. Blood sugar is stable and insulin is ordered per sliding scale. Warfarin to resume this evening. Patient is eating adequate meals. Call light and safety precautions are in place.     Problem: Diabetes/Glucose Control  Goal: Glucose maintained within prescribed range  Description: INTERVENTIONS:  - Monitor Blood Glucose as ordered  - Assess for signs and symptoms of hyperglycemia and hypoglycemia  - Administer ordered medications to maintain glucose within target range  - Assess barriers to adequate nutritional intake and initiate nutrition consult as needed  - Instruct patient on self management of diabetes  Outcome: Progressing

## 2023-11-04 NOTE — ED QUICK NOTES
Received patient. Patient sitting on cart, family at bedside. Will repeat accucheck after IVF finished. Patient awaiting room to be ready.   Patient AOx4

## 2023-11-04 NOTE — PLAN OF CARE
NURSING ADMISSION NOTE      Patient admitted via Cart  Oriented to room. Safety precautions initiated. Bed in low position. Call light in reach. Patient alert and oriented x4. VSS. Afebrile. C/o of pain in R neck d/t pinched nerve. PRN tylenol administered. Received from ED for elevated BG. Accucheck on floor = 156. Coverage provided per sliding scale. Education provided. Medications administered per MAR. IVF infusing. NPO. Med rec complete.

## 2023-11-05 VITALS
WEIGHT: 131.31 LBS | HEIGHT: 59 IN | OXYGEN SATURATION: 100 % | RESPIRATION RATE: 19 BRPM | TEMPERATURE: 97 F | BODY MASS INDEX: 26.47 KG/M2 | HEART RATE: 67 BPM | SYSTOLIC BLOOD PRESSURE: 136 MMHG | DIASTOLIC BLOOD PRESSURE: 63 MMHG

## 2023-11-05 LAB
ANION GAP SERPL CALC-SCNC: 6 MMOL/L (ref 0–18)
BASOPHILS # BLD AUTO: 0 X10(3) UL (ref 0–0.2)
BASOPHILS NFR BLD AUTO: 0 %
BUN BLD-MCNC: 31 MG/DL (ref 9–23)
CALCIUM BLD-MCNC: 8.7 MG/DL (ref 8.5–10.1)
CHLORIDE SERPL-SCNC: 113 MMOL/L (ref 98–112)
CO2 SERPL-SCNC: 22 MMOL/L (ref 21–32)
CREAT BLD-MCNC: 1.95 MG/DL
EGFRCR SERPLBLD CKD-EPI 2021: 28 ML/MIN/1.73M2 (ref 60–?)
EOSINOPHIL # BLD AUTO: 0.01 X10(3) UL (ref 0–0.7)
EOSINOPHIL NFR BLD AUTO: 0.3 %
ERYTHROCYTE [DISTWIDTH] IN BLOOD BY AUTOMATED COUNT: 13.1 %
GLUCOSE BLD-MCNC: 167 MG/DL (ref 70–99)
GLUCOSE BLD-MCNC: 174 MG/DL (ref 70–99)
GLUCOSE BLD-MCNC: 205 MG/DL (ref 70–99)
GLUCOSE BLD-MCNC: 50 MG/DL (ref 70–99)
GLUCOSE BLD-MCNC: 95 MG/DL (ref 70–99)
HCT VFR BLD AUTO: 31.3 %
HGB BLD-MCNC: 10.6 G/DL
IMM GRANULOCYTES # BLD AUTO: 0.01 X10(3) UL (ref 0–1)
IMM GRANULOCYTES NFR BLD: 0.3 %
INR BLD: 3.6 (ref 0.8–1.2)
LYMPHOCYTES # BLD AUTO: 0.67 X10(3) UL (ref 1–4)
LYMPHOCYTES NFR BLD AUTO: 21.5 %
MCH RBC QN AUTO: 33.7 PG (ref 26–34)
MCHC RBC AUTO-ENTMCNC: 33.9 G/DL (ref 31–37)
MCV RBC AUTO: 99.4 FL
MONOCYTES # BLD AUTO: 0.33 X10(3) UL (ref 0.1–1)
MONOCYTES NFR BLD AUTO: 10.6 %
NEUTROPHILS # BLD AUTO: 2.1 X10 (3) UL (ref 1.5–7.7)
NEUTROPHILS # BLD AUTO: 2.1 X10(3) UL (ref 1.5–7.7)
NEUTROPHILS NFR BLD AUTO: 67.3 %
OSMOLALITY SERPL CALC.SUM OF ELEC: 303 MOSM/KG (ref 275–295)
PLATELET # BLD AUTO: 296 10(3)UL (ref 150–450)
POTASSIUM SERPL-SCNC: 3.4 MMOL/L (ref 3.5–5.1)
PROTHROMBIN TIME: 36.5 SECONDS (ref 11.6–14.8)
RBC # BLD AUTO: 3.15 X10(6)UL
SODIUM SERPL-SCNC: 141 MMOL/L (ref 136–145)
WBC # BLD AUTO: 3.1 X10(3) UL (ref 4–11)

## 2023-11-05 PROCEDURE — 85610 PROTHROMBIN TIME: CPT | Performed by: STUDENT IN AN ORGANIZED HEALTH CARE EDUCATION/TRAINING PROGRAM

## 2023-11-05 PROCEDURE — 85025 COMPLETE CBC W/AUTO DIFF WBC: CPT | Performed by: STUDENT IN AN ORGANIZED HEALTH CARE EDUCATION/TRAINING PROGRAM

## 2023-11-05 PROCEDURE — 82962 GLUCOSE BLOOD TEST: CPT

## 2023-11-05 PROCEDURE — 80048 BASIC METABOLIC PNL TOTAL CA: CPT | Performed by: STUDENT IN AN ORGANIZED HEALTH CARE EDUCATION/TRAINING PROGRAM

## 2023-11-05 NOTE — PROGRESS NOTES
NURSING DISCHARGE NOTE    Discharged Home via Ambulatory. Accompanied by Family member  Belongings Taken by patient/family    Dc patient in stable condition, Saline lock removed,Dc   Instruction and follow up appt given,Verbalized needs and understanding. Hedy Jain

## 2023-11-05 NOTE — DISCHARGE INSTRUCTIONS
- Your INR on 11/5 is 3.60. Recommend holding Coumadin on 11/5 - 11/6. Recommend getting INR checked on 11/7 and follow directions from the Coumadin clinic based on your INR.  - Discontinue methylprednisolone Dosepak. - Continue your insulin regimen as per previous. Keep a record of her blood glucose. Your next long-acting insulin is due on 11/6.

## 2023-11-05 NOTE — PLAN OF CARE
Patient alert and oriented x4. VSS. Afebrile. No c/o pain at this time. Medications administered per MAR. BG - 314. Coverage provided per sliding scale. IVF infusing. Safety precautions continued. Call light within reach. 0518: BG - 50. Treated per protocol. MD notified, no new orders received.   Problem: Diabetes/Glucose Control  Goal: Glucose maintained within prescribed range  Description: INTERVENTIONS:  - Monitor Blood Glucose as ordered  - Assess for signs and symptoms of hyperglycemia and hypoglycemia  - Administer ordered medications to maintain glucose within target range  - Assess barriers to adequate nutritional intake and initiate nutrition consult as needed  - Instruct patient on self management of diabetes  Outcome: Progressing

## 2023-11-05 NOTE — PLAN OF CARE
Problem: Diabetes/Glucose Control  Goal: Glucose maintained within prescribed range  Description: INTERVENTIONS:  - Monitor Blood Glucose as ordered  - Assess for signs and symptoms of hyperglycemia and hypoglycemia  - Administer ordered medications to maintain glucose within target range  - Assess barriers to adequate nutritional intake and initiate nutrition consult as needed  - Instruct patient on self management of diabetes  Outcome: Not Progressing    Hypoglycemia this am.followed protocol,Denies any pain or discomfort. Ivf at  regulated rates,  No untoward reaction noted. Has fair appetite,Ambulates in the room,Will continue to  Monitor.

## 2023-11-05 NOTE — DISCHARGE SUMMARY
General Medicine Discharge Summary     Patient ID:  Kat Smart  61year old  10/2/1960    Admit date: 11/3/2023    Discharge date and time: 11/5/2023    Attending Physician: Felix Davis DO     Consults: IP CONSULT TO HOSPITALIST    Primary Care Physician: Antoine Bautista MD     Reason for admission: hyperglycemia    Risk For Readmission: low    Discharge Diagnoses: Type 2 diabetes mellitus with hyperglycemia, with long-term current use of insulin ([facility] [address] [E11.65, Z79.4]  See Additional Discharge Diagnoses in Hospital Course    Discharged Condition: stable    Follow-up with labs/images appointments: With PCP in the outpatient setting   Exam  Gen: No acute distress  Pulm: Lungs clear, normal respiratory effort  CV: Heart with regular rate and rhythm  Abd: Abdomen soft,       HPI:     Hospital Course:   Patient is a 61year old female with PMH sig for type 2 diabetes, CAD status post stents, A-fib on Coumadin, hypertension, hyperlipidemia, lupus with kidney involvement, status post renal transplant x3 who presents for evaluation of elevated blood glucose. Patient was managed with sliding scale insulin in the hospital.  She continued to improve. Based on her insulin coverage needed, patient will likely not require short acting insulin in the outpatient setting given discontinuation of additional steroids. Patient to continue her regular insulin regimen at discharge. Continue prednisone 5 mg daily but discontinue methylprednisolone Dosepak. Patient was also noted to have elevated INR of 3.6 and is recommended to discontinue Coumadin for 2 days and have a repeat INR check on Tuesday and initiate Coumadin per Coumadin clinic guidance. She understands and agrees to the plan.      Type 2 diabetes with hyperglycemia  - In the setting of steroid use  - Blood glucose 494 on presentation, no anion gap or symptoms  -Given short acting insulin, blood glucose improved  - Start home dose Levemir, high-dose ISS, Accu-Cheks  -Given the swelling has resolved, will not continue Medrol Dosepak, patient only took 1 day  - Continue home dose steroids as per below     CAD  Hypertension  Hyperlipidemia  - Status post stents  - Not on antiplatelets?  -Continue metoprolol, amlodipine and statin     A-fib  - Continue metoprolol, hold Coumadin  - INR checks-INR elevated, hold Coumadin     History of lupus with renal involvement  History of renal transplant x3  CKD stage III  - Last transplant was at [facility]  - Creatinine slightly elevated upon admission but stable after hydration,  -Continue prednisone 5 mg daily, azathioprine 50 mg daily and tacrolimus 2 mg twice daily           Operative Procedures:      Imaging: XR CHEST PA + LAT CHEST (CPT=71046)    Result Date: 11/3/2023  CONCLUSION:  There is no evidence of active cardiopulmonary disease. There are postoperative changes noted in the right cardiophrenic angle and scar-like densities in left lower lobe. LOCATION:     Dictated by (CST): Bib Chaudhari MD on 11/03/2023 at 5:29 PM     Finalized by (CST): Bib Chaudhari MD on 11/03/2023 at 5:30 PM          Disposition: home    Activity: activity as tolerated  Diet: cardiac diet  Wound Care: none needed  Code Status: Full Code  O2: None    Home Medication Changes:     Med list     Medication List        CONTINUE taking these medications      amLODIPine 10 MG Tabs  Commonly known as: Norvasc  TAKE 1 TABLET BY MOUTH  DAILY     azaTHIOprine 50 MG Tabs  Commonly known as: Imuran  Take 1 tablet (50 mg total) by mouth daily. Insulin Pen Needle 31G X 5 MM Misc  Commonly known as: BD Pen Needle Mini U/F  bid     metoprolol tartrate 25 MG Tabs  Commonly known as: Lopressor  TAKE 1 TABLET BY MOUTH  TWICE DAILY     OneTouch Delica Lancets 26S Misc  Test 3 times/day     predniSONE 5 MG Tabs  Commonly known as: Deltasone  Take 1 tablet (5 mg total) by mouth daily.      Semglee 100 UNIT/ML Soln  Generic drug: insulin glargine     simvastatin 20 MG Tabs  Commonly known as: Zocor  TAKE 1 TABLET BY MOUTH AT  NIGHT     tacrolimus 1 MG Caps  Commonly known as: Prograf  Take 2 capsules (2 mg total) by mouth 2 (two) times daily. STOP taking these medications      warfarin 2.5 MG Tabs  Commonly known as: Coumadin              FU   Follow-up Information       Asiha Noyola MD Follow up in 1 week(s). Specialty: Internal Medicine  Why: As needed  Contact information:  Felicita Popman [place] [phone]                             DC instructions: Other Discharge Instructions:         - Your INR on 11/5 is 3.60. Recommend holding Coumadin on 11/5 - 11/6. Recommend getting INR checked on 11/7 and follow directions from the Coumadin clinic based on your INR.  - Discontinue methylprednisolone Dosepak. - Continue your insulin regimen as per previous. Keep a record of her blood glucose. Your next long-acting insulin is due on 11/6. Discharge medications reviewed and reconciled.     Total Time Coordinating Care: Greater than 30 minutes    Patient had opportunity to ask questions and state understand and agree with therapeutic plan as outlined    Thank You,    Sonal Caballero, [facility] Hospitalist  Pager

## 2023-11-06 DIAGNOSIS — N18.4 CKD (CHRONIC KIDNEY DISEASE) STAGE 4, GFR 15-29 ML/MIN (HCC): ICD-10-CM

## 2023-11-06 DIAGNOSIS — Z94.0 KIDNEY TRANSPLANT RECIPIENT: ICD-10-CM

## 2023-11-06 DIAGNOSIS — T86.11 CHRONIC RENAL ALLOGRAFT NEPHROPATHY: ICD-10-CM

## 2023-11-06 DIAGNOSIS — I10 ESSENTIAL HYPERTENSION: ICD-10-CM

## 2023-11-06 RX ORDER — AZATHIOPRINE 50 MG/1
50 TABLET ORAL DAILY
Qty: 30 TABLET | Refills: 0 | Status: SHIPPED | OUTPATIENT
Start: 2023-11-06

## 2023-12-01 DIAGNOSIS — N18.4 CKD (CHRONIC KIDNEY DISEASE) STAGE 4, GFR 15-29 ML/MIN (HCC): ICD-10-CM

## 2023-12-01 DIAGNOSIS — Z94.0 KIDNEY TRANSPLANT RECIPIENT: ICD-10-CM

## 2023-12-01 DIAGNOSIS — I10 ESSENTIAL HYPERTENSION: ICD-10-CM

## 2023-12-01 DIAGNOSIS — T86.11 CHRONIC RENAL ALLOGRAFT NEPHROPATHY: ICD-10-CM

## 2023-12-04 RX ORDER — AZATHIOPRINE 50 MG/1
50 TABLET ORAL DAILY
Qty: 90 TABLET | Refills: 1 | Status: SHIPPED | OUTPATIENT
Start: 2023-12-04

## 2024-01-30 ENCOUNTER — TELEPHONE (OUTPATIENT)
Dept: NEPHROLOGY | Facility: CLINIC | Age: 64
End: 2024-01-30

## 2024-01-30 NOTE — TELEPHONE ENCOUNTER
Medication requested: predniSONE   Dose: 5 MG Oral Tab     Is patient requesting 30 or 90 day supply:  90    Pharmacy name/location:  [facility] [place] [address], [phone] [address] [place], [place] [id number]     LOV:  10/18/23    Is the patient due for appointment: 02/21/24  (if so, please schedule)    Additional notes:  Pt only has 3 more left         Medication requested: azaTHIOprine   Dose: 50 MG Oral Tab     Is patient requesting 30 or 90 day supply:  30    Pharmacy name/location:  [facility] [place] [address], [phone] [address] [place], [place] [id number]     LOV:  10/18/23    Is the patient due for appointment: 02/21/24   (if so, please schedule)    Additional notes:  Pt only has 3 more left           Medication requested: tacrolimus   Dose: 1 MG Oral Cap     Is patient requesting 30 or 90 day supply:  30    Pharmacy name/location:  [facility] [place] [address], [phone] [address] [place], [place] [id number]     LOV:  10/18/23    Is the patient due for appointment: 02/21/24  (if so, please schedule)    Additional notes:  Pt only has 3 more left

## 2024-02-01 DIAGNOSIS — N18.4 CKD (CHRONIC KIDNEY DISEASE) STAGE 4, GFR 15-29 ML/MIN (HCC): ICD-10-CM

## 2024-02-01 DIAGNOSIS — I10 ESSENTIAL HYPERTENSION: ICD-10-CM

## 2024-02-01 DIAGNOSIS — T86.11 CHRONIC RENAL ALLOGRAFT NEPHROPATHY: ICD-10-CM

## 2024-02-01 DIAGNOSIS — Z94.0 KIDNEY TRANSPLANT RECIPIENT: ICD-10-CM

## 2024-02-01 RX ORDER — PREDNISONE 5 MG/1
5 TABLET ORAL DAILY
Qty: 90 TABLET | Refills: 1 | Status: SHIPPED | OUTPATIENT
Start: 2024-02-01

## 2024-02-01 RX ORDER — AZATHIOPRINE 50 MG/1
50 TABLET ORAL DAILY
Qty: 90 TABLET | Refills: 1 | Status: SHIPPED | OUTPATIENT
Start: 2024-02-01

## 2024-02-01 RX ORDER — TACROLIMUS 1 MG/1
2 CAPSULE ORAL 2 TIMES DAILY
Qty: 360 CAPSULE | Refills: 1 | Status: SHIPPED | OUTPATIENT
Start: 2024-02-01

## 2024-03-28 RX ORDER — WARFARIN SODIUM 2.5 MG/1
2.5 TABLET ORAL DAILY
COMMUNITY
Start: 2024-01-04

## 2024-03-31 NOTE — H&P
64yo F with UUI and JIMBO. JIMBO more bothersome. Nocturia x2.     H/o kidney transplant (left side) for Lupus- has had 3 prior transplant failures but this one has lasted 20 years. On immunosuppression.     Also has DM with uncontrolled Hgb A1c (8.9). Also has 2 stents and on coumadin. No rUTIs. 6 ppd     Has tried and failed kegels for JIMBO for >1 years as well as behavioral modifications. Failed PFPT        Pelvic Surgeries: no      10 point ROS was completed and otherwise negative unless states in HPI           History/Other:          Current Outpatient Medications   Medication Sig Dispense Refill    Insulin Glargine-yfgn 100 UNIT/ML Subcutaneous Solution Pen-injector Inject 20 Units/day into the skin daily. 18 mL 0    amLODIPine 10 MG Oral Tab Take 1 tablet (10 mg total) by mouth daily. 90 tablet 3    simvastatin 20 MG Oral Tab Take 1 tablet (20 mg total) by mouth nightly. 90 tablet 3    metoprolol tartrate 25 MG Oral Tab Take 1 tablet (25 mg total) by mouth 2 (two) times daily. 180 tablet 3    warfarin 2.5 MG Oral Tab TAKE 1 TABLET BY MOUTH ON  Tuesday AND Thursday AND 2  TABLETS BY MOUTH ON ALL  OTHER DAYS AND AS DIRECTED 156 tablet 0    cyclobenzaprine 10 MG Oral Tab Take 5 mg by mouth 3 (three) times daily as needed.        SEMGLEE, YFGN, 100 UNIT/ML Subcutaneous Solution Pen-injector Inject 20 Units/day into the skin daily.        methylPREDNISolone 4 MG Oral Tablet Therapy Pack Use as directed for 6 days 21 tablet 0    HYDROcodone-acetaminophen (NORCO) 5-325 MG Oral Tab Take 1 tablet by mouth every 6 (six) hours as needed for Pain. 30 tablet 0    Continuous Blood Gluc  (FREESTYLE KATIA 2 READER) Does not apply Device 1 Units in the morning, at noon, and at bedtime. 1 each 0    Continuous Blood Gluc Sensor (FREESTYLE KATIA 2 SENSOR) Does not apply Misc 2 Units every 14 (fourteen) days. 2 each 11    Nystatin 059180 UNIT/GM External Powder Apply bid x 1 week and then prn 1 each 0    Insulin Pen Needle (BD  PEN NEEDLE MINI U/F) 31G X 5 MM Does not apply Misc bid 300 each 5    Glucagon, rDNA, (GLUCAGON EMERGENCY) 1 MG Injection Kit Prn hypoglycemia 1 kit 0    tacrolimus 1 MG Oral Cap Take 2 capsules (2 mg total) by mouth 2 (two) times daily. 360 capsule 3    predniSONE 5 MG Oral Tab Take 1 tablet (5 mg total) by mouth daily. 90 tablet 3    azathioprine 50 MG Oral Tab Take 1 tablet (50 mg total) by mouth daily. 90 tablet 3    ONETOUCH DELICA LANCETS 33G Does not apply Misc Test 3 times/day 300 each 3           Past Medical History:   Diagnosis Date    Arrhythmia      Atherosclerosis of coronary artery      Calculus of kidney      ULISES I (cervical intraepithelial neoplasia I) 2019     COLPOSCOPY    Diabetes (HCC)      Diabetes mellitus       due to prednisone    High blood pressure      High cholesterol      History of oral surgery 2018    HPV in female 2020    HYPERTENSION      Kidney replaced by transplant ,,     DR. BANKS([place]),    LGSIL on Pap smear of cervix 2018, 2020    LGSIL on Pap smear of cervix 2020    Lupus erythematosus      Ovarian cyst      PONV (postoperative nausea and vomiting)      RENAL DISEASE       kidney transplant              Past Surgical History:   Procedure Laterality Date    ANGIOPLASTY (CORONARY)   09     Xience Stent to LAD-Edward    ANGIOPLASTY (CORONARY)   1/12/10     Xience Stent to Ramus-Edward    AV FISTULA REVISION, OPEN        COLONOSCOPY,DIAGNOSTIC N/A 10/26/2015     Procedure: COLONOSCOPY, POSSIBLE BIOPSY, POSSIBLE POLYPECTOMY 71187;  Surgeon: Rory Lees MD;  Location: [facility]    COLPOSCOPY,BX CERVIX/ENDOCERV CURR   2018    COLPOSCOPY,BX CERVIX/ENDOCERV CURR   2020    KIDNEY SURGERY                OTHER         kidney transplants x 3    TUBAL LIGATION           Social History    Tobacco Use      Smoking status: Former        Packs/day: 0.50        Years: 6.00        Additional pack years: 0.00         Total pack years: 3.00        Types: Cigarettes        Start date: 1/30/2016      Smokeless tobacco: Never      Tobacco comments: cigarettes    Vaping Use      Vaping Use: Never used    Alcohol use: Yes      Alcohol/week: 0.0 standard drinks of alcohol      Comment: SOCIAL 2 per month    Drug use: No               Family History   Problem Relation Age of Onset    Diabetes Mother                 Objective:   GENERAL: well developed, well nourished, in no apparent distress  HEENT: atraumatic, normocephalic  LUNGS: normal respiratory motion without distress  CARDIO:NA  ABDOMEN: Soft, non-tender, non-distended  : Normal external genitalia with no lesions or discharge: yes; Vaginal Atrophy yes  none  Pelvic Floor TTP: none  JIMBO: +  Masses Appreciated: no  NEURO: Alert, oriented, no focal deficits   EXTREMITIES: Edema no     Cystometrics: no                 Lab Results   Component Value Date     GLUCOSEDIP Negative 01/26/2024     BILIRUBIN Negative 01/26/2024     KETONESDIP Negative 01/26/2024     SPECGRAVITY 1.020 01/26/2024     BLOODU Negative 01/26/2024     PHURINE 7.0 01/26/2024     PROTEINDIP Trace 01/26/2024     UROBILIN 1.0 01/26/2024     NITRITE Negative 01/26/2024     LEUKOCYTES Negative 01/26/2024            PVR: 84ml                 Assessment & Plan:   62yo F  Diagnoses and all orders for this visit:     Intrinsic sphincter deficiency (ISD)  -     URINALYSIS, AUTO, W/O SCOPE  -     MEASUREMENT, POST-VOIDING RESIDUAL URINE &/OR BLADDER CAPACITY, US, NON-IMAGING  -     DULY SURGERY SCHEDULING UROLOGY; Future     Urge incontinence of urine      Plan:  All options of JIMBO treatment discussed including physical therapy, pessary, MUS, fascial sling and bulkamid. Success rates, risks and benefits of each procedure discussed at length.      Has tried and failed kegels for JIMBO for >1 years as well as behavioral modifications. Failed PFPT     Will schedule for Bulkamid. Too high risk for sling given transplant,  immunosuppression and uncontrolled DM     Does not want med for UUI yet     Pelvic exam performed as a separate identifiable procedure        The patient indicates understanding of these issues and agrees to the plan.      Funmi De La Cruz DO, [facility]

## 2024-04-01 ENCOUNTER — HOSPITAL ENCOUNTER (OUTPATIENT)
Facility: HOSPITAL | Age: 64
Setting detail: HOSPITAL OUTPATIENT SURGERY
Discharge: HOME | End: 2024-04-01
Attending: STUDENT IN AN ORGANIZED HEALTH CARE EDUCATION/TRAINING PROGRAM | Admitting: STUDENT IN AN ORGANIZED HEALTH CARE EDUCATION/TRAINING PROGRAM
Payer: COMMERCIAL

## 2024-04-01 VITALS
RESPIRATION RATE: 16 BRPM | OXYGEN SATURATION: 98 % | BODY MASS INDEX: 27.42 KG/M2 | HEART RATE: 61 BPM | WEIGHT: 136 LBS | HEIGHT: 59 IN | DIASTOLIC BLOOD PRESSURE: 74 MMHG | SYSTOLIC BLOOD PRESSURE: 136 MMHG | TEMPERATURE: 98 F

## 2024-04-01 PROCEDURE — 0TVD8ZZ RESTRICTION OF URETHRA, VIA NATURAL OR ARTIFICIAL OPENING ENDOSCOPIC: ICD-10-PCS | Performed by: STUDENT IN AN ORGANIZED HEALTH CARE EDUCATION/TRAINING PROGRAM

## 2024-04-01 DEVICE — BULKAMID URETHRAL BULKING SYSTEM 2ML
Type: IMPLANTABLE DEVICE | Status: FUNCTIONAL
Brand: BULKAMID

## 2024-04-01 RX ORDER — LIDOCAINE HYDROCHLORIDE 10 MG/ML
INJECTION, SOLUTION EPIDURAL; INFILTRATION; INTRACAUDAL; PERINEURAL AS NEEDED
Status: DISCONTINUED | OUTPATIENT
Start: 2024-04-01 | End: 2024-04-01 | Stop reason: HOSPADM

## 2024-04-01 RX ORDER — CEPHALEXIN 500 MG/1
500 CAPSULE ORAL ONCE
Status: COMPLETED | OUTPATIENT
Start: 2024-04-01 | End: 2024-04-01

## 2024-04-01 RX ORDER — ASPIRIN 81 MG/1
81 TABLET ORAL DAILY
COMMUNITY

## 2024-04-01 NOTE — INTERVAL H&P NOTE
Pre-op Diagnosis: Intrinsic sphincter deficiency    The above referenced H&P was reviewed by Funmi De La Cruz DO on 4/1/2024, the patient was examined and no significant changes have occurred in the patient's condition since the H&P was performed.  I discussed with the patient and/or legal representative the potential benefits, risks and side effects of this procedure; the likelihood of the patient achieving goals; and potential problems that might occur during recuperation.  I discussed reasonable alternatives to the procedure, including risks, benefits and side effects related to the alternatives and risks related to not receiving this procedure.  We will proceed with procedure as planned.

## 2024-04-01 NOTE — OPERATIVE REPORT
[facility]  part of [facility]    Operative Note         Anali Gauthier Location: OR   CSN [id number] MRN [id number]   Admission Date 4/1/2024 Operation Date 4/1/2024   Attending Physician Funmi De La Cruz DO       Patient Name: Anali Gauthier     Preoperative Diagnosis: Intrinsic sphincter deficiency     Postoperative Diagnosis: Intrinsic sphincter deficiency     Procedure(s):  Cystoscopy, urethral  bulkamid injection     Primary Surgeon: Funmi De La Cruz DO           Anesthesia: Local     Specimen: * No specimens in log *     Estimated Blood Loss: <5ml  Complications: none      Operative Summary:      63-year-old female with intrinsic sphincter deficiency proven to have stress urinary incontinence on exam in the office.  She has had this for greater than 1 year and has failed Kegel exercises during this time.  She was given all the treatment options and has decided to undergo Bulkamid injection.  She was explained the risks, benefits and alternatives of the procedure and signed consent was placed in the chart.    Patient was positioned on the table in the lithotomy position and was prepped and draped in a usual sterile fashion. Anesthetic gel was administered inside the urethra, and the bladder was emptied using a disposable catheter. 10 ml lidocaine 1% was used to perform a paraurethral block bilaterally at the 3 and 9 o'clock positions approximately 5-10 minutes prior to the procedure.     At the start of the procedure, anesthetic gel was added to the end rotatable Bulkamid sheath and the water inflow was adjusted to produce an adequate stream. The Bulkamid Needle was then placed æ of the way into the needle channel of the rotatable sheath and the entire Bulkamid system was then advanced into the urethra until the bladder is visualised and inspected. The Bulkamid needle was then advanced into the needle channel on the rotatable sheath until the tip of the sheath is adjacent to the bladder neck.      The sheath was then rotated to the 7 o'clock position. The needed was then extend into the bladder until the 2cm dyan on the needle is visible. The Bulkamid system was then retracted until the tip of the needle was resting on the bladder neck. The needle was then retracted into the sheath and approximately 1.5cm from the bladder neck the Bulkamid system was pressed parallel against the urethral wall and the needle is then advanced into the submucosal tissue ensuring that the bevel of the needle was facing towards the lumen. The needle was advanced approximately 0.5cm, and the Bulkamid hydrogel was then injected until the Bulkamid cushion was visible and reached the midline of the urethral lumen.     The needle was then retracted back into the rotatable sheath and rotated to the next injection site. Subsequent injections were performed at 5 o'clock, 2 o'clock and 10 o'clock all along the same plane as the original injection until all (4) cushions met at the midline of the urethral lumen. 2mls Total of Bulkamid Hydrogel was used.   Approximately 500cc of fluid was left in the bladder and upon completion, the patient emptied her bladder without issues. The procedure was well tolerated, and EBL was minimal.     Patient was given instruction to contact the office if she had any difficulty urinating and was consulted about the potential of a “top up” procedure if the desired effect was not achieved. A single dose of Prophylactic antibiotics was given, and a follow up appointment was scheduled to evaluate improvement in 1 month.       Implants:   Implant Name Type Inv. Item Serial No.  Lot No. LRB No. Used Action   SYSTEM URETH NDL 2GA L12MM BULK BULKAMID - [id number]  SYSTEM URETH NDL 2GA L12MM BULK BULKAMID R#[id number] [employer] WD [id number] N/A 1 Implanted   SYSTEM URETH NDL 2GA L12MM BULK BULKAMID - SN/[id number]  SYSTEM URETH NDL 2GA L12MM BULK BULKAMID N/A [employer]  [id number] N/A 1 Implanted        Drains: none     Condition: stable to PACU       Funmi De La Cruz DO

## 2024-07-05 DIAGNOSIS — N18.4 CKD (CHRONIC KIDNEY DISEASE) STAGE 4, GFR 15-29 ML/MIN (HCC): ICD-10-CM

## 2024-07-05 DIAGNOSIS — Z94.0 KIDNEY TRANSPLANT RECIPIENT (HCC): ICD-10-CM

## 2024-07-05 RX ORDER — TACROLIMUS 1 MG/1
2 CAPSULE ORAL 2 TIMES DAILY
Qty: 360 CAPSULE | Refills: 3 | Status: SHIPPED | OUTPATIENT
Start: 2024-07-05

## 2024-07-31 DIAGNOSIS — Z94.0 KIDNEY TRANSPLANT RECIPIENT (HCC): ICD-10-CM

## 2024-07-31 DIAGNOSIS — N18.4 CKD (CHRONIC KIDNEY DISEASE) STAGE 4, GFR 15-29 ML/MIN (HCC): ICD-10-CM

## 2024-07-31 RX ORDER — AZATHIOPRINE 50 MG/1
50 TABLET ORAL DAILY
Qty: 90 TABLET | Refills: 3 | OUTPATIENT
Start: 2024-07-31

## 2024-08-01 DIAGNOSIS — N18.4 CKD (CHRONIC KIDNEY DISEASE) STAGE 4, GFR 15-29 ML/MIN (HCC): ICD-10-CM

## 2024-08-01 DIAGNOSIS — Z94.0 KIDNEY TRANSPLANT RECIPIENT (HCC): ICD-10-CM

## 2024-08-02 RX ORDER — PREDNISONE 5 MG/1
5 TABLET ORAL DAILY
Qty: 90 TABLET | Refills: 1 | Status: SHIPPED | OUTPATIENT
Start: 2024-08-02

## 2024-09-16 DIAGNOSIS — N18.4 CKD (CHRONIC KIDNEY DISEASE) STAGE 4, GFR 15-29 ML/MIN (HCC): ICD-10-CM

## 2024-09-16 DIAGNOSIS — Z94.0 KIDNEY TRANSPLANT RECIPIENT (HCC): ICD-10-CM

## 2024-09-16 RX ORDER — AZATHIOPRINE 50 MG/1
50 TABLET ORAL DAILY
Qty: 90 TABLET | Refills: 1 | Status: SHIPPED | OUTPATIENT
Start: 2024-09-16

## 2024-10-04 DIAGNOSIS — Z94.0 KIDNEY TRANSPLANT RECIPIENT (HCC): Primary | ICD-10-CM

## 2024-10-10 ENCOUNTER — LAB ENCOUNTER (OUTPATIENT)
Dept: LAB | Age: 64
End: 2024-10-10
Attending: INTERNAL MEDICINE
Payer: COMMERCIAL

## 2024-10-10 DIAGNOSIS — Z94.0 KIDNEY TRANSPLANT RECIPIENT (HCC): ICD-10-CM

## 2024-10-10 LAB
ANION GAP SERPL CALC-SCNC: 7 MMOL/L (ref 0–18)
BASOPHILS # BLD AUTO: 0.04 X10(3) UL (ref 0–0.2)
BASOPHILS NFR BLD AUTO: 1.1 %
BILIRUB UR QL STRIP.AUTO: NEGATIVE
BUN BLD-MCNC: 33 MG/DL (ref 9–23)
CALCIUM BLD-MCNC: 9.8 MG/DL (ref 8.7–10.4)
CHLORIDE SERPL-SCNC: 107 MMOL/L (ref 98–112)
CLARITY UR REFRACT.AUTO: CLEAR
CO2 SERPL-SCNC: 27 MMOL/L (ref 21–32)
CREAT BLD-MCNC: 2.09 MG/DL
EGFRCR SERPLBLD CKD-EPI 2021: 26 ML/MIN/1.73M2 (ref 60–?)
EOSINOPHIL # BLD AUTO: 0.05 X10(3) UL (ref 0–0.7)
EOSINOPHIL NFR BLD AUTO: 1.4 %
ERYTHROCYTE [DISTWIDTH] IN BLOOD BY AUTOMATED COUNT: 14.5 %
FASTING STATUS PATIENT QL REPORTED: YES
GLUCOSE BLD-MCNC: 127 MG/DL (ref 70–99)
GLUCOSE UR STRIP.AUTO-MCNC: 50 MG/DL
HCT VFR BLD AUTO: 38.1 %
HGB BLD-MCNC: 13.6 G/DL
IMM GRANULOCYTES # BLD AUTO: 0.02 X10(3) UL (ref 0–1)
IMM GRANULOCYTES NFR BLD: 0.5 %
KETONES UR STRIP.AUTO-MCNC: NEGATIVE MG/DL
LEUKOCYTE ESTERASE UR QL STRIP.AUTO: NEGATIVE
LYMPHOCYTES # BLD AUTO: 0.88 X10(3) UL (ref 1–4)
LYMPHOCYTES NFR BLD AUTO: 24.2 %
MCH RBC QN AUTO: 35.5 PG (ref 26–34)
MCHC RBC AUTO-ENTMCNC: 35.7 G/DL (ref 31–37)
MCV RBC AUTO: 99.5 FL
MONOCYTES # BLD AUTO: 0.28 X10(3) UL (ref 0.1–1)
MONOCYTES NFR BLD AUTO: 7.7 %
NEUTROPHILS # BLD AUTO: 2.37 X10 (3) UL (ref 1.5–7.7)
NEUTROPHILS # BLD AUTO: 2.37 X10(3) UL (ref 1.5–7.7)
NEUTROPHILS NFR BLD AUTO: 65.1 %
NITRITE UR QL STRIP.AUTO: NEGATIVE
OSMOLALITY SERPL CALC.SUM OF ELEC: 301 MOSM/KG (ref 275–295)
PH UR STRIP.AUTO: 6.5 [PH] (ref 5–8)
PLATELET # BLD AUTO: 214 10(3)UL (ref 150–450)
POTASSIUM SERPL-SCNC: 3.6 MMOL/L (ref 3.5–5.1)
PROT UR STRIP.AUTO-MCNC: 20 MG/DL
RBC # BLD AUTO: 3.83 X10(6)UL
RBC UR QL AUTO: NEGATIVE
SODIUM SERPL-SCNC: 141 MMOL/L (ref 136–145)
SP GR UR STRIP.AUTO: 1.01 (ref 1–1.03)
UROBILINOGEN UR STRIP.AUTO-MCNC: NORMAL MG/DL
WBC # BLD AUTO: 3.6 X10(3) UL (ref 4–11)

## 2024-10-10 PROCEDURE — 80048 BASIC METABOLIC PNL TOTAL CA: CPT

## 2024-10-10 PROCEDURE — 80197 ASSAY OF TACROLIMUS: CPT

## 2024-10-10 PROCEDURE — 81001 URINALYSIS AUTO W/SCOPE: CPT

## 2024-10-10 PROCEDURE — 85025 COMPLETE CBC W/AUTO DIFF WBC: CPT

## 2024-10-10 PROCEDURE — 36415 COLL VENOUS BLD VENIPUNCTURE: CPT

## 2024-10-14 LAB — TACROLIMUS LVL: 25.9 NG/ML

## 2024-10-31 ENCOUNTER — OFFICE VISIT (OUTPATIENT)
Dept: NEPHROLOGY | Facility: CLINIC | Age: 64
End: 2024-10-31
Payer: COMMERCIAL

## 2024-10-31 VITALS — BODY MASS INDEX: 26 KG/M2 | SYSTOLIC BLOOD PRESSURE: 138 MMHG | DIASTOLIC BLOOD PRESSURE: 72 MMHG | WEIGHT: 131.13 LBS

## 2024-10-31 DIAGNOSIS — N18.4 CKD (CHRONIC KIDNEY DISEASE) STAGE 4, GFR 15-29 ML/MIN (HCC): Primary | ICD-10-CM

## 2024-10-31 DIAGNOSIS — Z94.0 KIDNEY TRANSPLANT RECIPIENT (HCC): ICD-10-CM

## 2024-10-31 PROCEDURE — 99213 OFFICE O/P EST LOW 20 MIN: CPT | Performed by: INTERNAL MEDICINE

## 2024-10-31 RX ORDER — CLOPIDOGREL BISULFATE 75 MG/1
75 TABLET ORAL DAILY
COMMUNITY

## 2024-10-31 NOTE — PROGRESS NOTES
Nephrology Progress Note      ASSESSMENT/PLAN:      1) CKD 4- s/p 2nd renal transplant approx 20 yrs ago (SLE)- doing very well with stable Cr approx 2.2 mg/dl > 5 years; this has plateaued since she developed acute rejection after steroid withdrawal.  Her routine labs are otherwise unremarkable; urine sediment is bland without proteinuria or microscopic hematuria.  Prograf level has been therapeutic.  Meds are otherwise benign without NSAID use.  No evidence of a superimposed process such as glomerulopathy or interstitial nephritis. PLAN- d/w pt at length; expect good long-term renal survival given stable labs. Will continue usual prednisone 5 mg qd, prograf 2 mg bid + imuran 50 mg every day; refer for re-transplant eval when eGFR < 20 ml/min. To check labs q3 months and f/u yearly.      2) HTN- BP reasonable in 130/80 range on amlodipine / metoprolol- CPM.     3) DM 2- in part duet to tacrolimus-induced insulin resistance; A1C typically approx 7 on insulin        HPI:   Anali Gauthier is a 64 year old female who presents for follow-up of   Chief Complaint   Patient presents with    Renal Transplant    Chronic Kidney Disease    Hypertension       Presents for follow-up of kidney transplant; has been doing well over the past year with stable serum creatinine of 2.2 mg/dL. her diet is significantly better on a lower carb diet. Has been compliant with all of her medications.       HISTORY:  Past Medical History:    Arrhythmia    Atherosclerosis of coronary artery    Calculus of kidney    ULISES I (cervical intraepithelial neoplasia I)    COLPOSCOPY    Diabetes ([facility])    Diabetes mellitus    due to prednisone    High blood pressure    High cholesterol    History of blood transfusion    History of oral surgery    HPV in female    HYPERTENSION    Incontinence    Kidney replaced by transplant ([facility])    DR. BANKS([place]),    LGSIL on Pap smear of cervix    LGSIL on Pap smear of cervix    Lupus erythematosus    Ovarian cyst     PONV (postoperative nausea and vomiting)    RENAL DISEASE    kidney transplant    Renal disorder    Visual impairment    readers      Past Surgical History:   Procedure Laterality Date    Angioplasty (coronary)  2009    Xience Stent to LAD-Edward    Angioplasty (coronary)  2010    Xience Stent to Ramus-Edward    Av fistula revision, open      Cath drug eluting stent      Colonoscopy,diagnostic N/A 10/26/2015    Procedure: COLONOSCOPY, POSSIBLE BIOPSY, POSSIBLE POLYPECTOMY 50416;  Surgeon: Rory Lees MD;  Location: [facility], [facility]    Colposcopy,bx cervix/endocerv curr  2018    Colposcopy,bx cervix/endocerv curr  2020    Kidney surgery            Other      kidney transplants x 3    Tubal ligation        Family History   Problem Relation Age of Onset    Diabetes Mother       Social History:   Social History     Socioeconomic History    Marital status:     Number of children: 1   Occupational History    Occupation: [employer]   Tobacco Use    Smoking status: Some Days     Current packs/day: 0.50     Average packs/day: 0.5 packs/day for 8.8 years (4.4 ttl pk-yrs)     Types: Cigarettes     Start date: 2016    Smokeless tobacco: Never    Tobacco comments:     cigarettes   Vaping Use    Vaping status: Never Used   Substance and Sexual Activity    Alcohol use: Yes    Drug use: No    Sexual activity: Not Currently     Partners: Male     Social Drivers of Health     Food Insecurity: No Food Insecurity (11/3/2023)    Food Insecurity     Food Insecurity: Never true   Transportation Needs: No Transportation Needs (11/3/2023)    Transportation Needs     Lack of Transportation: No   Housing Stability: Low Risk  (11/3/2023)    Housing Stability     Housing Instability: No        Medications (Active prior to today's visit):  Current Outpatient Medications   Medication Sig Dispense Refill    clopidogrel 75 MG Oral Tab Take 1 tablet (75 mg total) by mouth daily.       azaTHIOprine 50 MG Oral Tab Take 1 tablet (50 mg total) by mouth daily. 90 tablet 1    predniSONE 5 MG Oral Tab Take 1 tablet (5 mg total) by mouth daily. 90 tablet 1    TACROLIMUS 1 MG Oral Cap TAKE 2 CAPSULES TWICE A  capsule 3    aspirin 81 MG Oral Tab EC Take 1 tablet (81 mg total) by mouth daily.      insulin glargine (SEMGLEE) 100 UNIT/ML Subcutaneous Solution Inject 20 Units into the skin every morning.      METOPROLOL TARTRATE 25 MG Oral Tab TAKE 1 TABLET BY MOUTH  TWICE DAILY 180 tablet 3    SIMVASTATIN 20 MG Oral Tab TAKE 1 TABLET BY MOUTH AT  NIGHT 90 tablet 3    AMLODIPINE BESYLATE 10 MG Oral Tab TAKE 1 TABLET BY MOUTH  DAILY 90 tablet 3    Insulin Pen Needle (BD PEN NEEDLE MINI U/F) 31G X 5 MM Does not apply Misc bid 300 Box 6    ONETOUCH DELICA LANCETS 33G Does not apply Misc Test 3 times/day 300 each 3       Allergies:  Allergies   Allergen Reactions    Tetracycline Base RASH    Toujeo Max Solostar [Lantus] NAUSEA ONLY and PAIN     Headache       ROS:     Denies fever/chills  Denies wt loss/gain  Denies HA or visual changes  Denies CP or palpitations  Denies SOB/cough/hemoptysis  Denies abd or flank pain  Denies N/V/D  Denies change in urinary habits or gross hematuria  Denies LE edema  Denies skin rashes/myalgias/arthralgias      PHYSICAL EXAM:   /72   Wt 131 lb 2 oz (59.5 kg)   LMP 11/01/2009   BMI 26.48 kg/m²   Wt Readings from Last 3 Encounters:   10/31/24 131 lb 2 oz (59.5 kg)   03/28/24 136 lb (61.7 kg)   11/03/23 131 lb 4.8 oz (59.6 kg)     General: Alert and oriented in no apparent distress.  HEENT: No scleral icterus, MMM  Neck: Supple, no OLENA or thyromegaly  Cardiac: Regular rate and rhythm, S1, S2 normal, no murmur or rub  Lungs: Clear without wheezes, rales, rhonchi.    Abdomen: Soft, non-tender. + bowel sounds, no palpable organomegaly  Extremities: Without clubbing, cyanosis or edema.  Neurologic: Alert and oriented, normal affect, cranial nerves grossly intact, moving  all extremities  Skin: Warm and dry, no rashes      Cosme Lyon MD  10/31/2023  304 PM

## 2024-12-27 NOTE — ED PROVIDER NOTES
Patient Seen in: [facility] Emergency Department      History     Chief Complaint   Patient presents with    Nausea/Vomiting/Diarrhea     Stated Complaint: n/v/d that started this morning    Subjective:   HPI      64-year-old female who presents to the emergency department having vomiting and diarrhea.  The patient said started early this morning woke her from sleeping.  Says she last tried to drink fluids 4 hours prior to arriving to the ER and had vomiting immediately after drinking.  She has a longstanding history of chronic kidney disease stage IV and was seen for an office visit on 10/31/2024.  She has paroxysmal atrial fibrillation.  She has diabetes.  She says that she has not had any sick contacts but has had some bodyaches and chills prior to the symptoms beginning.  No recent antibiotic therapy.  No unusual ingestions.    Objective:     Past Medical History:    Arrhythmia    Atherosclerosis of coronary artery    Calculus of kidney    ULISES I (cervical intraepithelial neoplasia I)    COLPOSCOPY    Diabetes ([facility])    Diabetes mellitus    due to prednisone    High blood pressure    High cholesterol    History of blood transfusion    History of oral surgery    HPV in female    HYPERTENSION    Incontinence    Kidney replaced by transplant ([facility])    DR. BANKS([place]),    LGSIL on Pap smear of cervix    LGSIL on Pap smear of cervix    Lupus erythematosus    Ovarian cyst    PONV (postoperative nausea and vomiting)    RENAL DISEASE    kidney transplant    Renal disorder    Visual impairment    readers              Past Surgical History:   Procedure Laterality Date    Angioplasty (coronary)  11/16/2009    Xience Stent to LAD-Edward    Angioplasty (coronary)  01/12/2010    Xience Stent to Ramus-Edward    Av fistula revision, open      Cath drug eluting stent      Colonoscopy,diagnostic N/A 10/26/2015    Procedure: COLONOSCOPY, POSSIBLE BIOPSY, POSSIBLE POLYPECTOMY 05173;  Surgeon: Rory Lees MD;   [facility]    Colposcopy,bx cervix/endocerv curr  2018    Colposcopy,bx cervix/endocerv curr  2020    Kidney surgery            Other      kidney transplants x 3    Tubal ligation                  Social History     Socioeconomic History    Marital status:     Number of children: 1   Occupational History    Occupation: [employer]   Tobacco Use    Smoking status: Some Days     Current packs/day: 0.50     Average packs/day: 0.5 packs/day for 8.9 years (4.5 ttl pk-yrs)     Types: Cigarettes     Start date: 2016    Smokeless tobacco: Never    Tobacco comments:     cigarettes   Vaping Use    Vaping status: Never Used   Substance and Sexual Activity    Alcohol use: Yes    Drug use: No    Sexual activity: Not Currently     Partners: Male     Social Drivers of Health     Food Insecurity: No Food Insecurity (11/3/2023)    Food Insecurity     Food Insecurity: Never true   Transportation Needs: No Transportation Needs (11/3/2023)    Transportation Needs     Lack of Transportation: No   Housing Stability: Low Risk  (11/3/2023)    Housing Stability     Housing Instability: No                  Physical Exam     ED Triage Vitals [24 1520]   /81   Pulse 99   Resp 17   Temp 98.5 °F (36.9 °C)   Temp src Temporal   SpO2 98 %   O2 Device None (Room air)       Current Vitals:   Vital Signs  BP: 117/72  Pulse: 85  Resp: 18  Temp: 98.5 °F (36.9 °C)  Temp src: Temporal  MAP (mmHg): 85    Oxygen Therapy  SpO2: 100 %  O2 Device: None (Room air)        Physical Exam  General: This a pleasant nontoxic appearing patient in no apparent distress alert and oriented ×3  HEENT: Pupils are equal reactive to light.  Extra ocular motions are intact.  No scleral icterus or conjunctival pallor: Neck is supple without tenderness on palpation.  Head is atraumatic normocephalic.  Oral mucosa dry.  Tongue is midline.  No posterior pharyngeal lesions.    Lungs: Clear to auscultation  bilaterally.  No wheezes, rhonchi, or rales appreciated.  No accessory muscle use noted for breathing.  Cardiac: Regular rate and rhythm.  Normal S1 and 2 without murmurs or ectopy appreciated  Abdomen: Soft on examination without tenderness to deep palpation or to percussion.  No masses appreciated.  Bowel sounds are normoactive.  No CVA tenderness.  Extremities: No cyanosis, no edema or clubbing.  Pulses are +2.  Full range of motion is noted of the extremities without deformities.  No tenderness.  Neurologically intact.  Skin is dry.  Moving all 4 without difficulty.  ED Course     Labs Reviewed   COMP METABOLIC PANEL (14) - Abnormal; Notable for the following components:       Result Value    Glucose 179 (*)     CO2 20.0 (*)     BUN 30 (*)     Creatinine 2.10 (*)     Calculated Osmolality 299 (*)     eGFR-Cr 26 (*)     All other components within normal limits   CBC WITH DIFFERENTIAL WITH PLATELET - Abnormal; Notable for the following components:    .0 (*)     MCH 35.2 (*)     Lymphocyte Absolute 0.23 (*)     All other components within normal limits   LIPASE - Normal   SCAN SLIDE                   MDM    Differential diagnosis includes but is not limited to electrolyte abnormality, acute pancreatitis, viral syndrome, acute kidney injury.  The patient's BUN is 30 creatinine of 2.1 which is similar to her chronic findings of stage IV renal disease.  Glucose of 179.  CO2 of 20.  The metabolic panel was normal.  CBC was normal.  Lipase was normal.  The patient received IV fluids and Zofran.  She also received Dilaudid for pain control.  Explained to the patient this is likely a viral illness causing symptomology.  She is able to tolerate fluids by mouth she should be safe for discharge to home with antiemetics.  Her abdomen was benign on examination and imaging was not undertaken at this time due to her benign nature of her presentation.  Patient was given a p.o. fluid challenge at 2030 p.m. Patient  tolerated the p.o. fluid challenge.  She felt much better and she will be discharged.  Told return immediately for any worsening symptoms such as intractable vomiting, intractable pain, alteration mental status or fevers.  Written for Zofran for home.  Appears to have viral syndrome causing symptomology.  Discharged in good condition.  Note to Patient  The 21st Century Cures Act makes medical notes like these available to patients in the interest of transparency. However, be advised this is a medical document and is intended as [id number] communication; it is written in medical language and may appear blunt, direct, or contain abbreviations or verbiage that are unfamiliar. Medical documents are intended to carry relevant information, facts as evident, and the clinical opinion of the practitioner.  Patient was evaluated and a screening exam was performed.   As a treating physician attending to the patient, I determined, within reasonable clinical confidence and prior to discharge, that an emergency medical condition was not or was no longer present.  There was no indication for further evaluation, treatment or admission on an emergency basis.  Comprehensive verbal and written discharge and follow-up instructions were provided to help prevent relapse or worsening.  Patient was instructed to follow-up with their primary care provider for further evaluation and treatment, but to return immediately to the ER for worsening, concerning, new, changing or persisting symptoms.  I discussed the case with the patient and they had no questions, complaints, or concerns.  Patient felt comfortable going home.  ^^Please note that this report has been produced using speech recognition software and may contain errors related to that system including, but not limited to, errors in grammar, punctuation, and spelling, as well as words and phrases that possibly may have been recognized inappropriately.  If there are any questions or  concerns, contact the dictating provider for clarification.  Medical Decision Making      Disposition and Plan     Clinical Impression:  1. Nausea vomiting and diarrhea    2. Viral syndrome         Disposition:  Discharge  12/26/2024  9:12 pm    Follow-up:  Ashia Noyola MD  [address] [address]  [phone]    Schedule an appointment as soon as possible for a visit in 2 day(s)            Medications Prescribed:  Current Discharge Medication List        START taking these medications    Details   ondansetron 4 MG Oral Tablet Dispersible Take 1 tablet (4 mg total) by mouth every 4 (four) hours as needed for Nausea.  Qty: 10 tablet, Refills: 0                 Supplementary Documentation:

## 2024-12-27 NOTE — ED QUICK NOTES
Rounded on pt. Plan of care review with pt and family member. Pt given a cup of water for PO challenge. Stretcher down, side rails up, call light within reach.

## 2025-04-25 NOTE — ED QUICK NOTES
Orders for admission, patient is aware of plan and ready to go upstairs. Any questions, please call ED RN Leatha at extension [phone].     Patient Covid vaccination status: Unvaccinated     COVID Test Ordered in ED: SARS-CoV-2/Flu A and B/RSV by PCR (GeneXpert)    COVID Suspicion at Admission: N/A    Running Infusions: Medication Infusions[1]     Mental Status/LOC at time of transport: Alert, oriented X4.    Other pertinent information:   CIWA score: N/A   NIH score:  N/A             [1]

## 2025-04-25 NOTE — ED QUICK NOTES
Patient ambulated to bathroom independently, with a slight limp in her right leg. Attempting to provide urine specimen.

## 2025-04-25 NOTE — ED QUICK NOTES
Patient has purewick in place, as she states her right hip is too painful to sit on toilet.  Aware we need a urine specimen.

## 2025-04-25 NOTE — H&P
[facility]   part of [facility]    History and Physical     Anali Gauthier Patient Status:  Emergency    10/2/1960 MRN [id number]   Location [facility] EMERGENCY DEPARTMENT Attending Baljit Almanzar MD   Hosp Day # 0 PCP Ashia Noyola MD     Chief Complaint: Hand swelling and pain     History of Present Illness: Anali Gauthier is a 64 year old female with history of coronary disease, kidney stones, type 2 diabetes, hypertension, hyperlipidemia, paroxysmal atrial fibrillation, history of a kidney transplant, history rheumatoid arthritis, history of lupus presenting with hand swelling and pain.  Patient says on Wednesday she felt a little chilled at work.  Thursday she was feeling generalized weakness and for the most part was unable to move around much around in her house.  She noticed as well her hands were swollen she had some right gluteal discomfort.  The symptoms continued into today where the pain in her hands were unbearable.  As result she came to the emergency room for further ration treatment.  She said similar symptoms like this occurred earlier this year at which point she was treated and discharged from the ER for a viral infection.  Patient otherwise denies any significant positive review of systems at this point.  Patient did have a low-grade fever at home of 99.  But was found to have a temperature of near 102 in the ER.    Past Medical History:Past Medical History[1]   Lupus    Past Surgical History: Past Surgical History[2]    Social History:  reports that she has quit smoking. Her smoking use included cigarettes. She started smoking about 9 years ago. She has a 4.6 pack-year smoking history. She has never used smokeless tobacco. She reports that she does not currently use alcohol. She reports that she does not use drugs.no illegal drugs, , 1 child, no cane or walker, live with sister    Family History: Family History[3]  Mother passed  Father passed    Allergies:  Allergies[4]    Medications:  Medications Ordered Prior to Encounter[5]    Review of Systems:   A comprehensive 14 point review of systems was completed.    Pertinent positives and negatives noted in the HPI.    Physical Exam:    /84   Pulse 82   Temp 99.1 °F (37.3 °C) (Oral)   Resp 18   Ht 4' 11\" (1.499 m)   Wt 136 lb (61.7 kg)   LMP 11/01/2009   SpO2 99%   BMI 27.47 kg/m²   General: No acute distress. Alert and oriented x 3.  HEENT: Normocephalic atraumatic. Moist mucous membranes. EOM-I  Neck: No JVD. No carotid bruits.  Respiratory: Clear to auscultation bilaterally. No wheezes. No crackles  Cardiovascular: S1, S2. Regular rate and rhythm. No murmurs  Chest and Back: No tenderness or deformity.  Abdomen: Soft, nontender, nondistended.  Positive bowel sounds. No rebound, guarding  Neurologic: No focal neurological deficits. CNII-XII grossly intact. Sensation and strength intact  Musculoskeletal: Moves all extremities.  Extremities: Bilateral hands with non pitting swelling. Tender to palpation, No tenderness of the LE  Integument: No new rashes or lesions.   Psychiatric: Appropriate mood and affect.      Diagnostic Data:      Labs:  Recent Labs   Lab 04/25/25  1446   WBC 2.5*   HGB 13.0   MCV 95.0   .0   INR 1.84*       Recent Labs   Lab 04/25/25  1446   GLU 49*   BUN 21   CREATSERUM 2.10*   CA 10.0   ALB 4.3   *   K 4.0   CL 98   CO2 19.0*   ALKPHO 97   AST 16   ALT <7*   BILT 1.1   TP 7.1       Estimated Creatinine Clearance: 26.4 mL/min (A) (based on SCr of 2.1 mg/dL (H)).    Recent Labs   Lab 04/25/25  1446   PTP 21.4*   INR 1.84*       No results for input(s): \"TROP\", \"CK\" in the last 168 hours.    Imaging: Imaging data reviewed in Epic.      ASSESSMENT / PLAN:   64 year old female with history of coronary disease, kidney stones, type 2 diabetes, hypertension, hyperlipidemia, paroxysmal atrial fibrillation, history of a kidney transplant, history rheumatoid arthritis, history of  lupus presenting with hand swelling and pain.      Bilateral Hand Pain  -etiology uncertain  -possible flare of RA  -consult Rheum  -iv morphine prn  -norco po prn  -check esr and crp    Fever  -etiology uncertain  -no evidence by history  -cxr neg  -ua pending  -blood cx pending  -check viral panel if febrile again  -ID consulted  -iv abx due to neutropenia     Neutropenia  -etiology uncertain  -Heme consulted    HTN  -sbp stable  -amlodipine  -metoprolol    HLD  -atorvastatin     Atrial fibrillation hx  -metoprolol  -eliquis    Type 2 DM  -hold home oral meds  -low dose insulin sliding scale    Lupus  RA  -prednisone  -azathioprine    Hx Kidney transplant  -tacrolimus  -prednisone  -renal consult    Quality:  DVT Prophylaxis: scd, eliquis  CODE status: Full per chart, POA is Moon the pt sister  Amy: none    Plan of care discussed with patient, son and staff    Dispo: no discharge    Ish Mccauley MD  [facility] Hospitalist  [phone]                           [1]   Past Medical History:   Arrhythmia    Atherosclerosis of coronary artery    Calculus of kidney    ULISES I (cervical intraepithelial neoplasia I)    COLPOSCOPY    Diabetes ([facility])    Diabetes mellitus    due to prednisone    High blood pressure    High cholesterol    History of blood transfusion    History of oral surgery    HPV in female    HYPERTENSION    Incontinence    Inflammatory arthritis    Kidney replaced by transplant ([facility])    DR. BANKS([facility]),    LGSIL on Pap smear of cervix    LGSIL on Pap smear of cervix    Lupus erythematosus    Ovarian cyst    PAF (paroxysmal atrial fibrillation) ([facility])    PONV (postoperative nausea and vomiting)    RENAL DISEASE    kidney transplant    Renal disorder    Visual impairment    readers   [2]   Past Surgical History:  Procedure Laterality Date    Angioplasty (coronary)  11/16/2009    Xience Stent to LAD-Edward    Angioplasty (coronary)  01/12/2010    Xience Stent to Ramus-Edward    Av fistula revision, open       Cath drug eluting stent      Colonoscopy,diagnostic N/A 10/26/2015    Procedure: COLONOSCOPY, POSSIBLE BIOPSY, POSSIBLE POLYPECTOMY 04273;  Surgeon: Rory Lees MD;  Location: [facility], [facility]    Colposcopy,bx cervix/endocerv curr  2018    Colposcopy,bx cervix/endocerv curr  2020    Kidney surgery            Other      kidney transplants x 3    Tubal ligation     [3]   Family History  Problem Relation Age of Onset    Diabetes Mother    [4]   Allergies  Allergen Reactions    Tetracycline Base RASH    Toujeo Max Solostar [Lantus] NAUSEA ONLY and PAIN     Headache   [5]   No current facility-administered medications on file prior to encounter.     Current Outpatient Medications on File Prior to Encounter   Medication Sig Dispense Refill    apixaban 5 MG Oral Tab Take 1 tablet (5 mg total) by mouth 2 (two) times daily.      predniSONE 5 MG Oral Tab Take 1 tablet (5 mg total) by mouth daily. 90 tablet 3    clopidogrel 75 MG Oral Tab Take 1 tablet (75 mg total) by mouth daily.      azaTHIOprine 50 MG Oral Tab Take 1 tablet (50 mg total) by mouth daily. 90 tablet 1    TACROLIMUS 1 MG Oral Cap TAKE 2 CAPSULES TWICE A  capsule 3    aspirin 81 MG Oral Tab EC Take 1 tablet (81 mg total) by mouth in the morning.      insulin glargine (SEMGLEE) 100 UNIT/ML Subcutaneous Solution Inject 20 Units into the skin every morning.      METOPROLOL TARTRATE 25 MG Oral Tab TAKE 1 TABLET BY MOUTH  TWICE DAILY 180 tablet 3    SIMVASTATIN 20 MG Oral Tab TAKE 1 TABLET BY MOUTH AT  NIGHT 90 tablet 3    AMLODIPINE BESYLATE 10 MG Oral Tab TAKE 1 TABLET BY MOUTH  DAILY 90 tablet 3    Insulin Pen Needle (BD PEN NEEDLE MINI U/F) 31G X 5 MM Does not apply Misc bid 300 Box 6    ONETOUCH DELICA LANCETS 33G Does not apply Misc Test 3 times/day 300 each 3

## 2025-04-25 NOTE — ED QUICK NOTES
Patient has a history of lupus, takes daily steroids. States her bilateral hand joints have had swelling, redness, pain and limited ROM since Wednesday. Her right hip joint is painful as well. Fever.  States this has happened before with her lupus.

## 2025-04-25 NOTE — ED QUICK NOTES
Patient's blood sugar=49 on CMP. Patient is awake, talking. States she's not eaten in 3 days.  1 amp D50 given.

## 2025-04-25 NOTE — ED PROVIDER NOTES
Patient Seen in: [facility] Emergency Department      History     Chief Complaint   Patient presents with    Fever    Joint Pain     Stated Complaint: swelling, body aches    Subjective:   HPI  Patient is a 64-year-old female presents emergency room with history of multiple complaints.  The patient has had bilateral hand swelling which has been ongoing for the last couple of days.  The patient does not have a history of lupus and states this pain feels very similar to what she has had previously when she gets fevers.  The patient has had a fever and a cough which has been ongoing the last couple of days.  The patient denies history of any recent travel.  The patient denies history of any ill contacts at home.  The patient denies history of any urinary complaints.  Patient also also complains of some pain to her right gluteal area.  The patient denies abdominal pain or chest pain.  Patient denies history of any other somatic complaints or discomfort at this time.    History of Present Illness               Objective:     Past Medical History:    Arrhythmia    Atherosclerosis of coronary artery    Calculus of kidney    ULISES I (cervical intraepithelial neoplasia I)    COLPOSCOPY    Diabetes ([facility])    Diabetes mellitus    due to prednisone    High blood pressure    High cholesterol    History of blood transfusion    History of oral surgery    HPV in female    HYPERTENSION    Incontinence    Inflammatory arthritis    Kidney replaced by transplant ([facility])    DR. BANKS([place]),    LGSIL on Pap smear of cervix    LGSIL on Pap smear of cervix    Lupus erythematosus    Ovarian cyst    PAF (paroxysmal atrial fibrillation) ([facility])    PONV (postoperative nausea and vomiting)    RENAL DISEASE    kidney transplant    Renal disorder    Visual impairment    readers              Past Surgical History:   Procedure Laterality Date    Angioplasty (coronary)  11/16/2009    Xience Stent to LAD-Edward    Angioplasty (coronary)   2010    Xience Stent to Ramus-Edward    Av fistula revision, open      Cath drug eluting stent      Colonoscopy,diagnostic N/A 10/26/2015    Procedure: COLONOSCOPY, POSSIBLE BIOPSY, POSSIBLE POLYPECTOMY 37582;  Surgeon: Rory Lees MD;  [facility]    Colposcopy,bx cervix/endocerv curr  2018    Colposcopy,bx cervix/endocerv curr  2020    Kidney surgery            Other      kidney transplants x 3    Tubal ligation                  Social History     Socioeconomic History    Marital status:     Number of children: 1   Occupational History    Occupation: [employer]   Tobacco Use    Smoking status: Former     Current packs/day: 0.50     Average packs/day: 0.5 packs/day for 9.2 years (4.6 ttl pk-yrs)     Types: Cigarettes     Start date: 2016    Smokeless tobacco: Never    Tobacco comments:     cigarettes   Vaping Use    Vaping status: Never Used   Substance and Sexual Activity    Alcohol use: Not Currently    Drug use: No    Sexual activity: Not Currently     Partners: Male     Social Drivers of Health     Food Insecurity: No Food Insecurity (2025)    NCSS - Food Insecurity     Worried About Running Out of Food in the Last Year: No     Ran Out of Food in the Last Year: No   Transportation Needs: No Transportation Needs (2025)    NCSS - Transportation     Lack of Transportation: No   Housing Stability: Not At Risk (2025)    NCSS - Housing/Utilities     Has Housing: Yes     Worried About Losing Housing: No     Unable to Get Utilities: No                                Physical Exam     ED Triage Vitals [25 1341]   /83   Pulse 114   Resp 18   Temp (!) 101.9 °F (38.8 °C)   Temp src Oral   SpO2 94 %   O2 Device None (Room air)       Current Vitals:   Vital Signs  BP: 156/84  Pulse: 82  Resp: 18  Temp: 99.1 °F (37.3 °C)  Temp src: Oral  MAP (mmHg): (!) 104    Oxygen Therapy  SpO2: 99 %  O2 Device: None (Room air)        Physical  Exam     Physical Exam         GENERAL: Well-developed, well-nourished female sitting up breathing easily in no apparent distress.  The patient is nontoxic in appearance.  HEENT: Head is normocephalic, atraumatic. Pupils are 4 mm equally round and reactive to light. Oropharynx is clear. Mucous membranes are moist.  NECK:  No meningeal signs or nuchal rigidity appreciated. No stridor.  LUNGS: Clear at the apices with diminished breath sounds at both bases.  HEART: Regular rhythm with a tachycardic rate. Normal S1, S2 no S3, or S4. No murmur.  ABDOMEN: There is no focal tenderness to palpation appreciated anywhere throughout the abdomen. There is no guarding, no rebound, no mass, and no organomegaly appreciated. There is normoactive bowel sounds. There is no hernia.  BACK: There is no focal tenderness to palpation throughout the thoracic or lumbar spinous processes.  There is no evidence of any abscess or erythema noted to the gluteal area bilaterally.  Exam done with female chaperone at the bedside.  EXTREMITIES: There is no cyanosis, clubbing, or edema appreciated. Pulses are 2+ and equal in all 4 extremities.  There is mild erythema and tenderness to the PIP joints and MCP joints of both right hand and left hand.  There is no other focal tenderness to either upper or lower extremities appreciated.  NEURO: Patient is awake, alert and oriented to time place and person. Motor strength is 5 over 5 in all 4 extremities. There are no gross motor or sensory deficits appreciated. Cranial nerves II through XII are intact.  Patient answering all questions appropriately          ED Course     Labs Reviewed   COMP METABOLIC PANEL (14) - Abnormal; Notable for the following components:       Result Value    Glucose 49 (*)     Sodium 130 (*)     CO2 19.0 (*)     Creatinine 2.10 (*)     Calculated Osmolality 270 (*)     eGFR-Cr 26 (*)     ALT <7 (*)     All other components within normal limits   CBC WITH DIFFERENTIAL WITH  PLATELET - Abnormal; Notable for the following components:    WBC 2.5 (*)     Neutrophil Absolute Prelim 0.34 (*)     Neutrophil Absolute 0.34 (*)     Lymphocyte Absolute 0.87 (*)     Monocyte Absolute 1.22 (*)     All other components within normal limits   PROTHROMBIN TIME (PT) - Abnormal; Notable for the following components:    PT 21.4 (*)     INR 1.84 (*)     All other components within normal limits   PTT, ACTIVATED - Abnormal; Notable for the following components:    PTT 42.1 (*)     All other components within normal limits   RBC MORPHOLOGY SCAN - Abnormal; Notable for the following components:    Clumped Platelets 1+ (*)     Giant platelets Few (*)     All other components within normal limits   PROCALCITONIN - Abnormal; Notable for the following components:    Procalcitonin 2.90 (*)     All other components within normal limits   POCT GLUCOSE - Abnormal; Notable for the following components:    POC Glucose 189 (*)     All other components within normal limits   LACTIC ACID, PLASMA - Normal   SARS-COV-2/FLU A AND B/RSV BY PCR (GENEXPERT) - Normal    Narrative:     This test is intended for the qualitative detection and differentiation of SARS-CoV-2, influenza A, influenza B, and respiratory syncytial virus (RSV) viral RNA in nasopharyngeal or nares swabs from individuals suspected of respiratory viral infection consistent with COVID-19 by their healthcare provider. Signs and symptoms of respiratory viral infection due to SARS-CoV-2, influenza, and RSV can be similar.    Test performed using the Xpert Xpress SARS-CoV-2/FLU/RSV (real time RT-PCR)  assay on the GeneXpert instrument, [employer], [place] [address].   This test is being used under the Food and Drug Administration's Emergency Use Authorization.    The authorized Fact Sheet for Healthcare Providers for this assay is available upon request from the laboratory.   SCAN SLIDE   URINALYSIS WITH CULTURE REFLEX   RAINBOW DRAW LAVENDER   RAINBOW DRAW LIGHT  GREEN   RAINBOW DRAW BLUE   BLOOD CULTURE   BLOOD CULTURE          Results     I personally reviewed the patient's chest x-ray images and my individual interpretation shows no evidence of any acute infiltrate.  I also reviewed the official radiology report which showed results as noted below.      XR CHEST AP PORTABLE  (CPT=71045)  Result Date: 4/25/2025  CONCLUSION:  Borderline heart size. No active disease seen.   LOCATION:  Critical access hospital      Dictated by (CST): Todd Severino MD on 4/25/2025 at 3:12 PM     Finalized by (CST): Todd Severino MD on 4/25/2025 at 3:12 PM                            MDM      Patient had an IV line established blood work drawn including a CBC, chemistries, BUN/creatinine, and blood sugar which showed evidence of white blood count of 2.5 with an ANC of 340.  Patient's coags are elevated as well with a pro time of 21.4 PTT 42.1 INR 1.84.  The patient's creatinine is 2.1 which is as per baseline.  Patient does have a history of a previous renal transplant.  Patient's lactic acid is normal.  The patient's Pro-Bladimir is elevated 2.9.  The patient could not give a urine specimen here in the ER.  Patient's RSV, COVID, and flu are all negative.  Patient placed on continuous pulse ox and cardiac monitor blood cultures were obtained here in the ER.  The patient could not give a urine specimen here in the ER.  The patient was given IV fluid was given a stress dose of IV steroids at 1 point had a blood sugar of 49 and was given an amp of D50 with improvement of patient's blood sugar after this was given.  Patient admits she has not been eating and drinking well over the last several days.  The patient will be admitted to the telemetry floor for further care.  The patient's case discussed with the [facility] hospitalist who came and saw the patient in the emergency room as well as erlinda infectious disease we did want the patient to be given empiric 2 g of cefepime at this time.  The patient will be admitted to the  hospital for further care at this time.  Patient admitted with no further new complaints.  Admission disposition: 4/25/2025  5:35 PM           Medical Decision Making      Disposition and Plan     Clinical Impression:  1. Neutropenic fever    2. Hypoglycemia         Disposition:  Admit  4/25/2025  5:35 pm    Follow-up:  No follow-up provider specified.        Medications Prescribed:  Current Discharge Medication List          Supplementary Documentation:         Hospital Problems       Present on Admission  Date Reviewed: 10/31/2024          ICD-10-CM Noted POA    * (Principal) Neutropenic fever D70.9, R50.81 4/25/2025 Unknown

## 2025-04-26 NOTE — CONSULTS
Rheumatology Consult Note    SUBJECTIVE:    Reason for Consultation: inflammatory arthritis    History of Present Illness: Patient is a 64 year old female with new onset inflammatory arthritis in both hands day after onset of febrile upper respiratory infection  - no rashes  - also had right hip pain, with difficulty walking but this is improved  - history of lupus nephritis and s/p renal transplant on tacrolimus, azathioprine and prednisone 5 mg qd  - chronic leukopenia   - chronic atrial fibrillation  - stable renal function  - locations include      Review of Systems:  General, HEENT, Respiratory, cardiovascular, gastrointestinal, vascular, dermatologic, musculoskeletal, neurologic system review was performed and pertinent positives per HPI, otherwise negative ROS    Past Medical History[1]   Past Surgical History[2]   Allergy: Allergies[3]   Medications - Current[4]   Social History: Short Social Hx on File[5]   Family History:  Family History[6]        EXAM:   /79 (BP Location: Left arm)   Pulse 63   Temp 98 °F (36.7 °C) (Oral)   Resp 18   Ht 4' 11\" (1.499 m)   Wt 136 lb (61.7 kg)   LMP 11/01/2009   SpO2 99%   BMI 27.47 kg/m²     Gen- well nourished, alert, no distress  Musc- Shoulders- normal ROM, S0T0 bilaterally  Elbows- normal ROM, S0T0 bilaterally  Wrists- normal ROM, S0T0 bilaterally  MCPs- S1T1 2-5th, no deformity bilaterally  PIPs- S1T1 2-5th,  no deformity bilaterally  Hips- normal ROM, no tenderness bilaterally  Knees- normal ROM, S0T0 bilaterally  Ankles-normal ROM,S0T0 bilaterally  Feet-  no swelling, no tenderness, no deformity bilaterally  Fibromyalgia tender points- 0/18      LABS:    Component      Latest Ref Rng 4/25/2025 4/26/2025   WBC      4.0 - 11.0 x10(3) uL  2.0 (L)    RBC      3.80 - 5.30 x10(6)uL  3.20 (L)    Hemoglobin      12.0 - 16.0 g/dL  10.8 (L)    Hematocrit      35.0 - 48.0 %  30.4 (L)    Platelet Count      150.0 - 450.0 10(3)uL  188.0    MCV      80.0 - 100.0 fL   95.0    MCH      26.0 - 34.0 pg  33.8    MCHC      31.0 - 37.0 g/dL  35.5    RDW      %  12.7    Prelim Neutrophil Abs      1.50 - 7.70 x10 (3) uL  0.64 (L)    Neutrophils Absolute      1.50 - 7.70 x10(3) uL  0.64 (L)    Lymphocytes Absolute      1.00 - 4.00 x10(3) uL  0.50 (L)    Monocytes Absolute      0.10 - 1.00 x10(3) uL  0.83    Eosinophils Absolute      0.00 - 0.70 x10(3) uL  0.00    Basophils Absolute      0.00 - 0.20 x10(3) uL  0.01    Immature Granulocyte Absolute      0.00 - 1.00 x10(3) uL  0.01    Neutrophils %      %  32.2    Lymphocytes %      %  25.1    Monocytes %      %  41.7    Eosinophils %      %  0.0    Basophils %      %  0.5    Immature Granulocyte %      %  0.5    Glucose      70 - 99 mg/dL  142 (H)    Sodium      136 - 145 mmol/L  136    Potassium      3.5 - 5.1 mmol/L  4.3    Chloride      98 - 112 mmol/L  107    Carbon Dioxide, Total      21.0 - 32.0 mmol/L  19.0 (L)    ANION GAP      0 - 18 mmol/L  10    BUN      9 - 23 mg/dL  28 (H)    CREATININE      0.55 - 1.02 mg/dL  2.05 (H)    CALCIUM      8.7 - 10.6 mg/dL  9.6    CALCULATED OSMOLALITY      275 - 295 mOsm/kg  290    EGFR      >=60 mL/min/1.73m2  27 (L)    AST (SGOT)      <34 U/L  12    ALT (SGPT)      10 - 49 U/L  <7 (L)    ALKALINE PHOSPHATASE      50 - 130 U/L  72    Total Bilirubin      0.2 - 1.1 mg/dL  0.7    PROTEIN, TOTAL      5.7 - 8.2 g/dL  6.0    Albumin      3.2 - 4.8 g/dL  3.5    Globulin      2.0 - 3.5 g/dL  2.5    A/G Ratio      1.0 - 2.0   1.4    C-REACTIVE PROTEIN      <=0.50 mg/dL 27.10 (H)     SED RATE      0 - 30 mm/Hr 71 (H)     URIC ACID      3.1 - 7.8 mg/dL  5.6       Legend:  (H) High  (L) Low      ASSESSMENT AND PLAN:   1 inflammatory arthritis-   Onset with respiratory infection with fevers and cough, suspect viral  - viral induced arthritis most likely  - unlikely to be related to underlying lupus on chronic immunosuppression  - advise increase in prednisone acutely to manage hopefully self limited joint  inflammtion  - case discussed with Dr. Lyon today  Total time spent on current patient encounter was  45  minutes, including pre-visit record review, performing medically appropriate exam, ordering medications and tests, documentation, and independent review of test results.      Ochoa Garcia MD  Rheumatology  Office: [phone]  FAX: [phone]           [1]   Past Medical History:   Arrhythmia    Atherosclerosis of coronary artery    Calculus of kidney    ULISES I (cervical intraepithelial neoplasia I)    COLPOSCOPY    Diabetes ([facility])    Diabetes mellitus    due to prednisone    High blood pressure    High cholesterol    History of blood transfusion    History of oral surgery    HPV in female    HYPERTENSION    Incontinence    Inflammatory arthritis    Kidney replaced by transplant ([facility])    DR. BANKS([place]),    LGSIL on Pap smear of cervix    LGSIL on Pap smear of cervix    Lupus erythematosus    Ovarian cyst    PAF (paroxysmal atrial fibrillation) ([facility])    PONV (postoperative nausea and vomiting)    RENAL DISEASE    kidney transplant    Renal disorder    Visual impairment    readers   [2]   Past Surgical History:  Procedure Laterality Date    Angioplasty (coronary)  2009    Xience Stent to LAD-Edward    Angioplasty (coronary)  2010    Xience Stent to Ramus-Edward    Av fistula revision, open      Cath drug eluting stent      Colonoscopy,diagnostic N/A 10/26/2015    Procedure: COLONOSCOPY, POSSIBLE BIOPSY, POSSIBLE POLYPECTOMY 97319;  Surgeon: Rory Lees MD;  [place] [facility] [place] [facility]    Colposcopy,bx cervix/endocerv curr  2018    Colposcopy,bx cervix/endocerv curr  2020    Kidney surgery            Other      kidney transplants x 3    Tubal ligation     [3]   Allergies  Allergen Reactions    Tetracycline Base RASH    Toujeo Max Solostar [Lantus] NAUSEA ONLY and PAIN     Headache   [4] No current outpatient medications on file.  [5]   Social  History  Socioeconomic History    Marital status:     Number of children: 1   Occupational History    Occupation: [employer]   Tobacco Use    Smoking status: Former     Current packs/day: 0.50     Average packs/day: 0.5 packs/day for 9.2 years (4.6 ttl pk-yrs)     Types: Cigarettes     Start date: 1/30/2016    Smokeless tobacco: Never    Tobacco comments:     cigarettes   Vaping Use    Vaping status: Never Used   Substance and Sexual Activity    Alcohol use: Not Currently    Drug use: No    Sexual activity: Not Currently     Partners: Male     Social Drivers of Health     Food Insecurity: No Food Insecurity (4/25/2025)    NCSS - Food Insecurity     Worried About Running Out of Food in the Last Year: No     Ran Out of Food in the Last Year: No   Transportation Needs: No Transportation Needs (4/25/2025)    NCSS - Transportation     Lack of Transportation: No   Housing Stability: Not At Risk (4/25/2025)    NCSS - Housing/Utilities     Has Housing: Yes     Worried About Losing Housing: No     Unable to Get Utilities: No   [6]   Family History  Problem Relation Age of Onset    Diabetes Mother

## 2025-04-26 NOTE — CONSULTS
[facility]   part of [facility] Infectious Disease  Report of Consultation    Anali Gauthier Patient Status:  Inpatient    10/2/1960 MRN [id number]   Location [facility] 5NW-A Attending Jennifer Lowry MD   Hosp Day # 1 PCP Ashia Noyola MD     Date of Admission:  2025  Date of Consult:  2025    Reason for Consultation:  Fever, neutropenia    History of Present Illness:  Anali Gauthier is a a(n) 64 year old female being seen at your request regarding fevers with neutropenia.  Patient is immunocompromised with a h/o RA and lupus and she presented to the ED with hand swelling, pain, and fevers.  Patient reports a similar presentation earlier this year when she had a viral infection.  R hip pain as well.  Temp noted to be 102F in the ED.  Because of some associated neutropenia, empiric cefepime was started.  Patient has now been evaluated by additional specialists.  Neutropenia is acute on chronic.      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      Patient is currently on IV cefepime empirically.  PCT mildly elevated but in the setting of some ANA as well.  We are asked to see and assist.    History:  Past Medical History[1]  Past Surgical History[2]  Family History[3]   reports that she has quit smoking. Her smoking use included cigarettes. She started smoking about 9 years ago. She has a 4.6 pack-year smoking history. She has never used smokeless tobacco. She reports that she does not currently use alcohol. She reports that she does not use drugs.    Allergies:  Allergies[4]    Medications:  Current Hospital Medications[5]    Review of Systems:    Constitutional:  Fevers.   HEENT:  No visual changes, oral ulcers, sore throat, difficulty swallowing.   Respiratory: Negative for cough, sputum, hemoptysis, chest pain, wheezing, dyspnea on exertion, or stridor.   Cardiovascular: Negative for chest  pain, palpitations, irregular heart beats.   Gastrointestinal:  No abdominal pain, nausea, vomiting, diarrhea, or constipation.   Genitourinary:  No dysuria, hematuria, urine urgency or frequency.   Integument/breast: Negative for rash, skin lesions, and pruritus.   Hematologic/lymphatic: Negative for easy bruising, bleeding, and lymphadenopathy.   Musculoskeletal: Hand pain, R hip pain.   Neurological: No focal neurologic deficits, seizures, tremors.   Psych:  No h/o anxiety, depression, other psych d/o.   Endocrine: No history of of diabetes, thyroid disorder.    Remainder of 12 point review of systems otherwise negative.    Vital signs in last 24 hours:  Patient Vitals for the past 24 hrs:   BP Temp Temp src Pulse Resp SpO2 Weight   04/26/25 1145 137/55 98.3 °F (36.8 °C) Oral 64 19 100 % --   04/26/25 0836 148/79 98 °F (36.7 °C) Oral 63 18 99 % --   04/26/25 0540 126/62 98.2 °F (36.8 °C) Oral 62 18 -- --   04/25/25 2329 -- -- -- -- -- -- 136 lb (61.7 kg)   04/25/25 2021 144/74 99 °F (37.2 °C) Oral 75 18 97 % --   04/25/25 1815 -- -- -- 99 16 100 % --   04/25/25 1730 156/84 -- -- 82 18 99 % --   04/25/25 1728 -- 99.1 °F (37.3 °C) Oral -- -- -- --   04/25/25 1700 147/82 -- -- 82 19 100 % --   04/25/25 1645 156/79 -- -- 88 17 100 % --   04/25/25 1630 145/79 -- -- 91 17 100 % --   04/25/25 1545 158/76 -- -- 92 18 99 % --   04/25/25 1500 -- -- -- 99 26 98 % --       Intake/Output:  I/O this shift:  In: 240 [P.O.:240]  Out: -     Physical Exam:   General: Awake, alert, non-tox and in NAD.   Head: Normocephalic, without obvious abnormality, atraumatic.   Eyes: Conjunctivae/corneas clear.  No scleral icterus.  No conjunctival     hemorrhage.   Nose: Nares normal.   Throat:  Oropharynx clear, MMs moist.   Neck: Trachea ML, no masses.   Lungs: CTA b/l no rhonchi, rales, wheezes.   Chest wall: No tenderness or deformity.   Heart: Regular rate and rhythm, normal S1S2, no murmurs.   Abdomen: Soft, NT/ND.  Bowel sounds  present.  No organomegaly.   Extremity: No edema.   Skin: No rashes or lesions.   Neurological: No focal neurologic deficits.    Lab Data Review:  Lab Results   Component Value Date    WBC 2.0 04/26/2025    HGB 10.8 04/26/2025    HCT 30.4 04/26/2025    .0 04/26/2025    CREATSERUM 2.05 04/26/2025    BUN 28 04/26/2025     04/26/2025    K 4.3 04/26/2025     04/26/2025    CO2 19.0 04/26/2025     04/26/2025    CA 9.6 04/26/2025    ALB 3.5 04/26/2025    ALKPHO 72 04/26/2025    BILT 0.7 04/26/2025    TP 6.0 04/26/2025    AST 12 04/26/2025    ALT <7 04/26/2025    MG 1.6 04/26/2025    B12 478 04/26/2025        Cultures:   Blood cultures negative  Flu/COVID/RSV negative    Radiology:  FINDINGS:  The heart is borderline in size.  The lungs are clear of acute-appearing disease process.  The costophrenic angles are sharp. There is no active disease seen on the basis of portable radiography.     Assessment and Plan:    Fevers and acute on chronic neutropenia in this immunosuppressed patient  - Suspect a component of ?post-viral reactive arthritis vs. Flare in arthritis/lupus alone  - Blood cultures negative  - Flu/COVID/RSV negative, will check full RVP  - Urine with minimal sediment  - IV cefepime started empirically    2.  Immunosuppressed patient with a h/o RA/lupus  - Agree with stress dose steroids as Rx    3.  Disposition - inpatient.  Hands and hip feeling better with steroid burst.  F/u all pending cultures.  Trending temps and WBCs.  Supportive care ongoing.  Continue empiric IV cefepime for now but hope to streamline quickly as fevers resolve if all cultures remain negative.  D/w patient.  She is hoping to go home tomorrow.    Barbie Silva [facility], located within [facility] Infectious Disease  [phone]    4/26/2025  2:52 PM         [1]   Past Medical History:   Arrhythmia    Atherosclerosis of coronary artery    Calculus of kidney    ULISES I (cervical intraepithelial neoplasia I)     COLPOSCOPY    Diabetes ([facility])    Diabetes mellitus    due to prednisone    High blood pressure    High cholesterol    History of blood transfusion    History of oral surgery    HPV in female    HYPERTENSION    Incontinence    Inflammatory arthritis    Kidney replaced by transplant ([facility])    DR. BANKS([place]),    LGSIL on Pap smear of cervix    LGSIL on Pap smear of cervix    Lupus erythematosus    Ovarian cyst    PAF (paroxysmal atrial fibrillation) ([facility])    PONV (postoperative nausea and vomiting)    RENAL DISEASE    kidney transplant    Renal disorder    Visual impairment    readers   [2]   Past Surgical History:  Procedure Laterality Date    Angioplasty (coronary)  2009    Xience Stent to LAD-Edward    Angioplasty (coronary)  2010    Xience Stent to Ramus-Edward    Av fistula revision, open      Cath drug eluting stent      Colonoscopy,diagnostic N/A 10/26/2015    Procedure: COLONOSCOPY, POSSIBLE BIOPSY, POSSIBLE POLYPECTOMY 69504;  Surgeon: Rory Lees MD;  [facility]    Colposcopy,bx cervix/endocerv curr  2018    Colposcopy,bx cervix/endocerv curr  2020    Kidney surgery            Other      kidney transplants x 3    Tubal ligation     [3]   Family History  Problem Relation Age of Onset    Diabetes Mother    [4]   Allergies  Allergen Reactions    Tetracycline Base RASH    Toujeo Max Solostar [Lantus] NAUSEA ONLY and PAIN     Headache   [5]   Current Facility-Administered Medications:     [START ON 2025] predniSONE (Deltasone) tab 20 mg, 20 mg, Oral, Daily    amLODIPine (Norvasc) tab 10 mg, 10 mg, Oral, Daily    apixaban (Eliquis) tab 5 mg, 5 mg, Oral, BID    aspirin DR tab 81 mg, 81 mg, Oral, Daily    azaTHIOprine (Imuran) tab 50 mg, 50 mg, Oral, Daily    metoprolol tartrate (Lopressor) tab 25 mg, 25 mg, Oral, 2x Daily(Beta Blocker)    atorvastatin (Lipitor) tab 10 mg, 10 mg, Oral, Nightly    tacrolimus (Prograf) cap 2 mg, 2 mg, Oral, BID     glucose (Dex4) 15 GM/59ML oral liquid 15 g, 15 g, Oral, Q15 Min PRN **OR** glucose (Glutose) 40% oral gel 15 g, 15 g, Oral, Q15 Min PRN **OR** glucose-vitamin C (Dex-4) chewable tab 4 tablet, 4 tablet, Oral, Q15 Min PRN **OR** dextrose 50% injection 50 mL, 50 mL, Intravenous, Q15 Min PRN **OR** glucose (Dex4) 15 GM/59ML oral liquid 30 g, 30 g, Oral, Q15 Min PRN **OR** glucose (Glutose) 40% oral gel 30 g, 30 g, Oral, Q15 Min PRN **OR** glucose-vitamin C (Dex-4) chewable tab 8 tablet, 8 tablet, Oral, Q15 Min PRN    sodium chloride 0.9% infusion, , Intravenous, Continuous    acetaminophen (Tylenol Extra Strength) tab 500 mg, 500 mg, Oral, Q4H PRN    ondansetron (Zofran) 4 MG/2ML injection 4 mg, 4 mg, Intravenous, Q6H PRN    prochlorperazine (Compazine) 10 MG/2ML injection 5 mg, 5 mg, Intravenous, Q8H PRN    morphINE PF 2 MG/ML injection 1 mg, 1 mg, Intravenous, Q2H PRN **OR** morphINE PF 2 MG/ML injection 2 mg, 2 mg, Intravenous, Q2H PRN    acetaminophen (Tylenol) tab 650 mg, 650 mg, Oral, Q4H PRN **OR** HYDROcodone-acetaminophen (Norco) 5-325 MG per tab 1 tablet, 1 tablet, Oral, Q4H PRN **OR** HYDROcodone-acetaminophen (Norco) 5-325 MG per tab 2 tablet, 2 tablet, Oral, Q4H PRN    insulin aspart (NovoLOG) 100 Units/mL FlexPen 1-5 Units, 1-5 Units, Subcutaneous, TID AC and HS    ceFEPIme (Maxipime) 1 g in sodium chloride 0.9% 100mL IVPB-ZOEY, 1 g, Intravenous, Q24H

## 2025-04-26 NOTE — ED QUICK NOTES
Patient resting quietly, watching TV.  Cefepime infusing.   well developed, well nourished , in no acute distress , ambulating without difficulty , normal communication ability

## 2025-04-26 NOTE — PLAN OF CARE
Problem: Patient/Family Goals  Goal: Patient/Family Long Term Goal  Description: Patient's Long Term Goal: Discharge back to home  Interventions:- Follow POC- See additional Care Plan goals for specific interventions  Outcome: Progressing  Goal: Patient/Family Short Term Goal  Description: Patient's Short Term Goal: 4/25 NOC:pain management  4/26AM: Rheum consult, pain management  Interventions: - PRN pain meds- See additional Care Plan goals for specific interventions  Outcome: Progressing

## 2025-04-26 NOTE — PROGRESS NOTES
[facility] Hospitalist Progress Note                                                                     [facility]   part of [facility]      Anali Gauthier  10/2/1960    SUBJECTIVE:pt feeling better today but still with pain in her hands. No chest pain, palpitations, shortness of breath, cough, nausea, vomiting, abdominal pain.     OBJECTIVE:  Temp:  [99 °F (37.2 °C)-101.9 °F (38.8 °C)] 99 °F (37.2 °C)  Pulse:  [] 75  Resp:  [16-26] 18  BP: (144-158)/(74-84) 144/74  SpO2:  [94 %-100 %] 97 %  Exam  Gen: No acute distress, alert and oriented   Pulm: Lungs clear bilaterally, normal respiratory effort, no crackles, no wheezing  CV: Heart with regular rate and rhythm, no murmur.   Abd: Abdomen soft, nontender, nondistended, bowel sounds present  MSK: No significant pitting edema or tenderness of the LE  Skin: no new rashes or lesions    Labs:   Recent Labs   Lab 04/25/25  1446   WBC 2.5*   HGB 13.0   MCV 95.0   .0   INR 1.84*       Recent Labs   Lab 04/25/25  1446   *   K 4.0   CL 98   CO2 19.0*   BUN 21   CREATSERUM 2.10*   CA 10.0   GLU 49*       Recent Labs   Lab 04/25/25  1446   ALT <7*   AST 16   ALB 4.3       Recent Labs   Lab 04/25/25  1608 04/25/25  2149 04/25/25  2235   PGLU 189* 237* 215*       Meds:   Scheduled: Scheduled Medications[1]  Continuous Infusions: Medication Infusions[2]  PRN: PRN Medications[3]    ASSESSMENT / PLAN:   64 year old female with history of coronary disease, kidney stones, type 2 diabetes, hypertension, hyperlipidemia, paroxysmal atrial fibrillation, history of a kidney transplant, history rheumatoid arthritis, history of lupus presenting with hand swelling and pain.       Bilateral Hand Pain  -etiology uncertain  -possible flare of RA  -consult Rheum  -iv morphine prn  -norco po prn  -check esr and crp--> pending     Fever--> resolved  -etiology uncertain  -no evidence by history  -cxr neg  -ua pending--> eng  -blood  cx pending--> ngtd  -check viral panel if febrile again  -ID consulted  -iv abx due to neutropenia      Neutropenia  -etiology uncertain  -Heme consulted     HTN  -sbp stable  -amlodipine  -metoprolol     HLD  -atorvastatin      Atrial fibrillation hx  -metoprolol  -eliquis     Type 2 DM  -hold home oral meds  -low dose insulin sliding scale     Lupus  RA  -prednisone  -azathioprine     Hx Kidney transplant  -tacrolimus  -prednisone  -renal consult     Quality:  DVT Prophylaxis: scd, eliquis  CODE status: Full per chart, POA is Mono the pt sister  Amy: none     Plan of care discussed with patient, son and staff     Dispo: no discharge     Ish Mccauley MD  [facility] Hospitalist  [phone]           [1]    amLODIPine  10 mg Oral Daily    apixaban  5 mg Oral BID    aspirin  81 mg Oral Daily    azaTHIOprine  50 mg Oral Daily    metoprolol tartrate  25 mg Oral 2x Daily(Beta Blocker)    predniSONE  5 mg Oral Daily    atorvastatin  10 mg Oral Nightly    tacrolimus  2 mg Oral BID    insulin aspart  1-5 Units Subcutaneous TID AC and HS    cefepime  1 g Intravenous Q24H   [2]    sodium chloride 83 mL/hr at 04/25/25 2019   [3]   glucose **OR** glucose **OR** glucose-vitamin C **OR** dextrose **OR** glucose **OR** glucose **OR** glucose-vitamin C    acetaminophen    ondansetron    prochlorperazine    morphINE **OR** morphINE    acetaminophen **OR** HYDROcodone-acetaminophen **OR** HYDROcodone-acetaminophen

## 2025-04-26 NOTE — PLAN OF CARE
A&OX4. Maintaining O2 on RA. Tele, ST. PRNs given for pain on both hands. Up Add sussy. PIV IVF. Skin C/D/I. Cardiac,carb controlled diet. Accu check QID.On eliquis.Prednisone PO.Neutropenic and seizure precautions in place.No further needs at this time. Continue POC. Safety precaution in place.   Problem: Patient/Family Goals  Goal: Patient/Family Long Term Goal  Description: Patient's Long Term Goal: Discharge back to home  Interventions:- Follow POC- See additional Care Plan goals for specific interventions  Outcome: Progressing  Goal: Patient/Family Short Term Goal  Description: Patient's Short Term Goal: 4/25 NOC:pain management  Interventions: - PRN pain meds- See additional Care Plan goals for specific interventions  Outcome: Progressing

## 2025-04-26 NOTE — PROGRESS NOTES
NURSING ADMISSION NOTE      Patient admitted via Cart  Oriented to room 532  Safety precautions initiated.  Bed in low position.  Call light in reach.

## 2025-04-26 NOTE — CONSULTS
[facility]  Report of Consultation    Anali Gauthier Patient Status:  Inpatient    10/2/1960 MRN [id number]   Location [facility] 5NW-A Attending Jennifer Lowry MD   Hosp Day # 1 PCP Ashia Noyola MD     Reason for Consultation:  neutropenia    History of Present Illness:  Anali Gauthier is a 63yo female with PMH of CAD, kidney stones, diabetes, hypertension, hyperlipidemia, A-fib, history of kidney transplant, RA, lupus who was admitted for fevers and hand swelling.  Patient states for the last few days she has been having weakness, swelling of her hands, some trouble with ambulation, low-grade temperatures.  She says she had a temperature to 99 at home.  She says the symptoms are similar to her normal lupus flares.  She had somewhat similar symptoms in December when she was admitted to the hospital as well.  She was felt to have a viral syndrome at that time.  She was managed supportively.  She came to the ER yesterday.  In the ER, temp was one 1.9.  Her labs showed WBC 2.5 and .  She was given IV cefepime.  She was also given IV fluids.  She was admitted for further evaluation.  She is afebrile now.  Her ANC is improved to 640.  She is on antirejection meds for her kidney transplant.  She says she does not follow with a rheumatologist. Denies any sick contacts.  She does have some cough, congestion, rhinorrhea.  She denies any chest pain, shortness of breath, abdominal pain, nausea, vomiting, diarrhea.    History:  Past Medical History[1]  Past Surgical History[2]  Family History[3]   reports that she has quit smoking. Her smoking use included cigarettes. She started smoking about 9 years ago. She has a 4.6 pack-year smoking history. She has never used smokeless tobacco. She reports that she does not currently use alcohol. She reports that she does not use drugs.    Allergies:  Allergies[4]    Medications:  Current Hospital Medications[5]    Review of Systems:  A 10-point review of systems  was done with pertinent positives and negatives per the HPI.    Physical Exam:   Blood pressure 148/79, pulse 63, temperature 98 °F (36.7 °C), temperature source Oral, resp. rate 18, height 4' 11\" (1.499 m), weight 136 lb (61.7 kg), last menstrual period 11/01/2009, SpO2 99%, not currently breastfeeding.    Vital Signs: /79 (BP Location: Left arm)   Pulse 63   Temp 98 °F (36.7 °C) (Oral)   Resp 18   Ht 4' 11\" (1.499 m)   Wt 136 lb (61.7 kg)   LMP 11/01/2009   SpO2 99%   BMI 27.47 kg/m²     General: lying in bed, appears fatigued   HEENT: normocephalic   Neck: supple   Chest: Clear to auscultation.  Heart: Regular rate .   Abdomen: Soft, non tender   Extremities: mild swelling of hands  Neurological: AOx3  Skin: no rash      Laboratory Data:    Lab Results   Component Value Date    WBC 2.0 04/26/2025    HGB 10.8 04/26/2025    HCT 30.4 04/26/2025    .0 04/26/2025    CREATSERUM 2.05 04/26/2025    BUN 28 04/26/2025     04/26/2025    K 4.3 04/26/2025     04/26/2025    CO2 19.0 04/26/2025     04/26/2025    CA 9.6 04/26/2025    ALB 3.5 04/26/2025    ALKPHO 72 04/26/2025    BILT 0.7 04/26/2025    TP 6.0 04/26/2025    AST 12 04/26/2025    ALT <7 04/26/2025    PTT 42.1 04/25/2025    INR 1.84 04/25/2025    PTP 21.4 04/25/2025    ESRML 71 04/25/2025    CRP 27.10 04/25/2025    MG 1.6 04/26/2025    B12 478 04/26/2025    PGLU 146 04/26/2025         Impression and Plan:    Neutropenia  -  on admission  - now   - has chronic leukopenia with mostly lymphopenia likely from rheumatologic disease, antirejection meds  - Generally does not have significant neutropenia in the past  - Neutropenia most likely due to lupus flare versus viral illness  - LDH, B12, folate nl  - Check respiratory virus panel  - Check peripheral blood flow cytometry    Fever  - T 101.9 on admission  - concern for lupus flare vs viral illness  - resp virus panel ordered  - ID consulted - abx per them  - Rheum  consulted    Lupus  - states she does not follow with Rheumatologist  - Rheum consulted    History of kidney transplant  - Nephro consulted - antirejection meds per Nephro    Anemia  - mild, normocytic  - 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    Elda Hagan MD  [facility]  Department of Oncology and Hematology  2025         [1]   Past Medical History:   Arrhythmia    Atherosclerosis of coronary artery    Calculus of kidney    ULISES I (cervical intraepithelial neoplasia I)    COLPOSCOPY    Diabetes ([facility])    Diabetes mellitus    due to prednisone    High blood pressure    High cholesterol    History of blood transfusion    History of oral surgery    HPV in female    HYPERTENSION    Incontinence    Inflammatory arthritis    Kidney replaced by transplant ([facility])    DR. BANKS([place]),    LGSIL on Pap smear of cervix    LGSIL on Pap smear of cervix    Lupus erythematosus    Ovarian cyst    PAF (paroxysmal atrial fibrillation) ([facility])    PONV (postoperative nausea and vomiting)    RENAL DISEASE    kidney transplant    Renal disorder    Visual impairment    readers   [2]   Past Surgical History:  Procedure Laterality Date    Angioplasty (coronary)  2009    Xience Stent to LAD-Edward    Angioplasty (coronary)  2010    Xience Stent to Ramus-Edward    Av fistula revision, open      Cath drug eluting stent      Colonoscopy,diagnostic N/A 10/26/2015    Procedure: COLONOSCOPY, POSSIBLE BIOPSY, POSSIBLE POLYPECTOMY 80497;  Surgeon: Rory Lees MD;  [place] [facility]    Colposcopy,bx cervix/endocerv curr  2018    Colposcopy,bx cervix/endocerv curr  2020    Kidney surgery            Other      kidney  transplants x 3    Tubal ligation     [3]   Family History  Problem Relation Age of Onset    Diabetes Mother    [4]   Allergies  Allergen Reactions    Tetracycline Base RASH    Toujeo Max Solostar [Lantus] NAUSEA ONLY and PAIN     Headache   [5]   Current Facility-Administered Medications:     amLODIPine (Norvasc) tab 10 mg, 10 mg, Oral, Daily    apixaban (Eliquis) tab 5 mg, 5 mg, Oral, BID    aspirin DR tab 81 mg, 81 mg, Oral, Daily    azaTHIOprine (Imuran) tab 50 mg, 50 mg, Oral, Daily    metoprolol tartrate (Lopressor) tab 25 mg, 25 mg, Oral, 2x Daily(Beta Blocker)    predniSONE (Deltasone) tab 5 mg, 5 mg, Oral, Daily    atorvastatin (Lipitor) tab 10 mg, 10 mg, Oral, Nightly    tacrolimus (Prograf) cap 2 mg, 2 mg, Oral, BID    glucose (Dex4) 15 GM/59ML oral liquid 15 g, 15 g, Oral, Q15 Min PRN **OR** glucose (Glutose) 40% oral gel 15 g, 15 g, Oral, Q15 Min PRN **OR** glucose-vitamin C (Dex-4) chewable tab 4 tablet, 4 tablet, Oral, Q15 Min PRN **OR** dextrose 50% injection 50 mL, 50 mL, Intravenous, Q15 Min PRN **OR** glucose (Dex4) 15 GM/59ML oral liquid 30 g, 30 g, Oral, Q15 Min PRN **OR** glucose (Glutose) 40% oral gel 30 g, 30 g, Oral, Q15 Min PRN **OR** glucose-vitamin C (Dex-4) chewable tab 8 tablet, 8 tablet, Oral, Q15 Min PRN    sodium chloride 0.9% infusion, , Intravenous, Continuous    acetaminophen (Tylenol Extra Strength) tab 500 mg, 500 mg, Oral, Q4H PRN    ondansetron (Zofran) 4 MG/2ML injection 4 mg, 4 mg, Intravenous, Q6H PRN    prochlorperazine (Compazine) 10 MG/2ML injection 5 mg, 5 mg, Intravenous, Q8H PRN    morphINE PF 2 MG/ML injection 1 mg, 1 mg, Intravenous, Q2H PRN **OR** morphINE PF 2 MG/ML injection 2 mg, 2 mg, Intravenous, Q2H PRN    acetaminophen (Tylenol) tab 650 mg, 650 mg, Oral, Q4H PRN **OR** HYDROcodone-acetaminophen (Norco) 5-325 MG per tab 1 tablet, 1 tablet, Oral, Q4H PRN **OR** HYDROcodone-acetaminophen (Norco) 5-325 MG per tab 2 tablet, 2 tablet, Oral, Q4H PRN    insulin  aspart (NovoLOG) 100 Units/mL FlexPen 1-5 Units, 1-5 Units, Subcutaneous, TID AC and HS    ceFEPIme (Maxipime) 1 g in sodium chloride 0.9% 100mL IVPB-ZOEY, 1 g, Intravenous, Q24H

## 2025-04-26 NOTE — CONSULTS
[facility]  Report of Consultation    Anali Gauthier Patient Status:  Inpatient    10/2/1960 MRN [id number]   Location [facility] 5NW-A Attending Jennifer Lowry MD   Hosp Day # 1 PCP Ashia Noyola MD       Assessment / Plan:    1) CKD 4- s/p 2nd kidney transplant approx 20 yrs ago (SLE)- doing well with stable Cr 2-2.4 mg/dl x yrs which has plateaued after developing acute rejection to due steroid taper. 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 Acute infectious process- await RVP; as per heme    5) HTN- on amlodipine / metoprolol     6) PAF- on BB / eliquis    7) Longstanding SLE / RA      Reason for Consultation:  CKD 4 / kidney transplant recip    History of Present Illness:  Anali Gauthier is a a(n) 64 year old female.  Very pleasant 64-year-old female with history of type 2 diabetes hypertension paroxysmal atrial fibrillation remote kidney transplant for lupus nephritis, rheumatoid arthritis who was feeling well on Tuesday but Wednesday developed chills and a low-grade temperature of 99; woke up Thursday with severe bilateral joint swelling/pain of both hands and right hip; the hand symptoms have occurred several times over the last 5 years and has happened both spontaneously and in the setting of a possible viral/infectious process.  Has been given steroids in the past with improvement but it has also resolved spontaneously.  No recent travel or sick contacts.  No changes in medications.  Temperature 102 on admission.    History:  Past Medical History[1]  Past Surgical History[2]  Family History[3]  Denies family history of kidney  disease.    reports that she has quit smoking. Her smoking use included cigarettes. She started smoking about 9 years ago. She has a 4.6 pack-year smoking history. She has never used smokeless tobacco. She reports that she does not currently use alcohol. She reports that she does not use drugs.    Allergies:  Allergies[4]    Medications:  Current Hospital Medications[5]  Prior to Admission Medications[6]    Review of Systems:  Please see HPI for pertinent positives. 10 point review of systems otherwise reviewed and negative.     Physical Exam:  /79 (BP Location: Left arm)   Pulse 63   Temp 98 °F (36.7 °C) (Oral)   Resp 18   Ht 4' 11\" (1.499 m)   Wt 136 lb (61.7 kg)   LMP 2009   SpO2 99%   BMI 27.47 kg/m²   Temp (24hrs), Av.2 °F (37.3 °C), Min:98 °F (36.7 °C), Max:101.9 °F (38.8 °C)       Intake/Output Summary (Last 24 hours) at 2025 0950  Last data filed at 2025 1725  Gross per 24 hour   Intake 1000 ml   Output --   Net 1000 ml     Wt Readings from Last 3 Encounters:   25 136 lb (61.7 kg)   24 126 lb (57.2 kg)   10/31/24 131 lb 2 oz (59.5 kg)     General: awake alert  HEENT: No scleral icterus, MMM  Neck: Supple, no OLENA or thyromegaly  Cardiac: Regular rate and rhythm, S1, S2 normal, no murmur, rub, or gallop  Lungs: Decreased breath sounds at the bases bilaterally.   Abdomen: Soft, non-tender. + bowel sounds, no palpable organomegaly  Extremities: Without clubbing, cyanosis; no edema  Neurologic: Cranial nerves grossly intact, moving all extremities  Skin: Warm and dry, no rashes      Laboratory Data:  Lab Results   Component Value Date    WBC 2.0 2025    HGB 10.8 2025    HCT 30.4 2025    .0 2025    CREATSERUM 2.05 2025    BUN 28 2025     2025    K 4.3 2025     2025    CO2 19.0 2025     2025    CA 9.6 2025    ALB 3.5 2025    ALKPHO 72 2025    BILT 0.7 2025     TP 6.0 2025    AST 12 2025    ALT <7 2025    PTT 42.1 2025    INR 1.84 2025    PTP 21.4 2025    ESRML 71 2025    CRP 27.10 2025    MG 1.6 2025    B12 478 2025    PGLU 146 2025       Imaging:  All imaging studies reviewed.      Thank you for allowing me to participate in the care of your patient.    Cosme Lyon MD  2025  9:50 AM         [1]   Past Medical History:   Arrhythmia    Atherosclerosis of coronary artery    Calculus of kidney    ULISES I (cervical intraepithelial neoplasia I)    COLPOSCOPY    Diabetes ([facility])    Diabetes mellitus    due to prednisone    High blood pressure    High cholesterol    History of blood transfusion    History of oral surgery    HPV in female    HYPERTENSION    Incontinence    Inflammatory arthritis    Kidney replaced by transplant ([facility])    DR. BANKS([place]),    LGSIL on Pap smear of cervix    LGSIL on Pap smear of cervix    Lupus erythematosus    Ovarian cyst    PAF (paroxysmal atrial fibrillation) ([facility])    PONV (postoperative nausea and vomiting)    RENAL DISEASE    kidney transplant    Renal disorder    Visual impairment    readers   [2]   Past Surgical History:  Procedure Laterality Date    Angioplasty (coronary)  2009    Xience Stent to LAD-Edward    Angioplasty (coronary)  2010    Xience Stent to Ramus-Edward    Av fistula revision, open      Cath drug eluting stent      Colonoscopy,diagnostic N/A 10/26/2015    Procedure: COLONOSCOPY, POSSIBLE BIOPSY, POSSIBLE POLYPECTOMY 96308;  Surgeon: Rory Lees MD;  [place] [facility] [facility]    Colposcopy,bx cervix/endocerv curr  2018    Colposcopy,bx cervix/endocerv curr  2020    Kidney surgery            Other      kidney transplants x 3    Tubal ligation     [3]   Family History  Problem Relation Age of Onset    Diabetes Mother    [4]   Allergies  Allergen Reactions    Tetracycline Base RASH    Toujeo Max Solostar  [Lantus] NAUSEA ONLY and PAIN     Headache   [5]   Current Facility-Administered Medications:     phytonadione (Vitamin K) 1 mg/mL oral syringe 2.5 mg, 2.5 mg, Oral, Once    amLODIPine (Norvasc) tab 10 mg, 10 mg, Oral, Daily    apixaban (Eliquis) tab 5 mg, 5 mg, Oral, BID    aspirin DR tab 81 mg, 81 mg, Oral, Daily    azaTHIOprine (Imuran) tab 50 mg, 50 mg, Oral, Daily    metoprolol tartrate (Lopressor) tab 25 mg, 25 mg, Oral, 2x Daily(Beta Blocker)    predniSONE (Deltasone) tab 5 mg, 5 mg, Oral, Daily    atorvastatin (Lipitor) tab 10 mg, 10 mg, Oral, Nightly    tacrolimus (Prograf) cap 2 mg, 2 mg, Oral, BID    glucose (Dex4) 15 GM/59ML oral liquid 15 g, 15 g, Oral, Q15 Min PRN **OR** glucose (Glutose) 40% oral gel 15 g, 15 g, Oral, Q15 Min PRN **OR** glucose-vitamin C (Dex-4) chewable tab 4 tablet, 4 tablet, Oral, Q15 Min PRN **OR** dextrose 50% injection 50 mL, 50 mL, Intravenous, Q15 Min PRN **OR** glucose (Dex4) 15 GM/59ML oral liquid 30 g, 30 g, Oral, Q15 Min PRN **OR** glucose (Glutose) 40% oral gel 30 g, 30 g, Oral, Q15 Min PRN **OR** glucose-vitamin C (Dex-4) chewable tab 8 tablet, 8 tablet, Oral, Q15 Min PRN    sodium chloride 0.9% infusion, , Intravenous, Continuous    acetaminophen (Tylenol Extra Strength) tab 500 mg, 500 mg, Oral, Q4H PRN    ondansetron (Zofran) 4 MG/2ML injection 4 mg, 4 mg, Intravenous, Q6H PRN    prochlorperazine (Compazine) 10 MG/2ML injection 5 mg, 5 mg, Intravenous, Q8H PRN    morphINE PF 2 MG/ML injection 1 mg, 1 mg, Intravenous, Q2H PRN **OR** morphINE PF 2 MG/ML injection 2 mg, 2 mg, Intravenous, Q2H PRN    acetaminophen (Tylenol) tab 650 mg, 650 mg, Oral, Q4H PRN **OR** HYDROcodone-acetaminophen (Norco) 5-325 MG per tab 1 tablet, 1 tablet, Oral, Q4H PRN **OR** HYDROcodone-acetaminophen (Norco) 5-325 MG per tab 2 tablet, 2 tablet, Oral, Q4H PRN    insulin aspart (NovoLOG) 100 Units/mL FlexPen 1-5 Units, 1-5 Units, Subcutaneous, TID AC and HS    ceFEPIme (Maxipime) 1 g in sodium  chloride 0.9% 100mL IVPB-ZOEY, 1 g, Intravenous, Q24H  [6]   No current outpatient medications on file.

## 2025-04-27 NOTE — PROGRESS NOTES
Patient woke up complaining of itching to the top of her head, arms, legs and groin.  There is no redness or rashes noted.  She denies difficulty breathing.  Page sent to DR Segovia.  Will hold Cefepime and give Benadryl as ordered.

## 2025-04-27 NOTE — PROGRESS NOTES
[facility]  Nephrology Progress Note    Anali Gauthier Attending:  Ish Mccauley MD       Assessment and Plan:    1) CKD 4- s/p 2nd kidney transplant approx 20 yrs ago (SLE)- doing well with stable Cr 2-2.4 mg/dl x yrs which has plateaued after developing acute rejection to due steroid taper. Continue usual pred 5 mg daily / tacro 2 mg bid / imuran 50 mg daily     2) Acute inflammatory arthritis (likely related to viral process) +/- underlying RA- much improved with steroids. Home with steroid taper as per Dr Garcia     3) DM 2- in part due to tacrolimus induced insulin resistance     4) Chronic leukopenia + new neutropenia- due to azathioprine, SLE, viral syndrome- improved     5) HTN- on amlodipine / metoprolol      6) PAF- on BB / eliquis     7) Longstanding SLE / RA    Agree with dc home today      Subjective:  Awake alert feeling much better    Physical Exam:   /62 (BP Location: Left arm)   Pulse 62   Temp 98.3 °F (36.8 °C) (Oral)   Resp 17   Ht 4' 11\" (1.499 m)   Wt 136 lb (61.7 kg)   LMP 2009   SpO2 100%   BMI 27.47 kg/m²   Temp (24hrs), Av.5 °F (36.9 °C), Min:98.3 °F (36.8 °C), Max:98.8 °F (37.1 °C)       Intake/Output Summary (Last 24 hours) at 2025 1342  Last data filed at 2025 0945  Gross per 24 hour   Intake 2186 ml   Output --   Net 2186 ml     Wt Readings from Last 3 Encounters:   25 136 lb (61.7 kg)   24 126 lb (57.2 kg)   10/31/24 131 lb 2 oz (59.5 kg)     General: awake alert  HEENT: No scleral icterus, MMM  Neck: Supple, no OLENA or thyromegaly  Cardiac: Regular rate and rhythm, S1, S2 normal, no murmur or tub  Lungs: Decreased BS at bases bilaterally   Abdomen: Soft, non-tender. + bowel sounds, no palpable organomegaly  Extremities: Without clubbing, cyanosis; no edema  Neurologic: Cranial nerves grossly intact, moving all extremities  Skin: Warm and dry, no rashes       Labs:   Lab Results   Component Value Date    WBC 2.1 2025    HGB 10.4  04/27/2025    HCT 28.9 04/27/2025    .0 04/27/2025    CREATSERUM 2.04 04/27/2025    BUN 36 04/27/2025     04/27/2025    K 4.4 04/27/2025     04/27/2025    CO2 20.0 04/27/2025     04/27/2025    CA 9.6 04/27/2025    INR 1.41 04/27/2025    PTP 17.4 04/27/2025    PGLU 317 04/27/2025       Imaging:  All imaging studies reviewed.    Meds:   Current Hospital Medications[1]      Questions/concerns were discussed with patient and/or family by bedside.          Cosme Lyon MD  4/27/2025  1:42 PM         [1]   Current Facility-Administered Medications   Medication Dose Route Frequency    diphenhydrAMINE (Benadryl) cap/tab 25 mg  25 mg Oral Q6H PRN    insulin degludec (Tresiba) 100 units/mL flextouch 20 Units  20 Units Subcutaneous Daily    predniSONE (Deltasone) tab 20 mg  20 mg Oral Daily    amLODIPine (Norvasc) tab 10 mg  10 mg Oral Daily    apixaban (Eliquis) tab 5 mg  5 mg Oral BID    aspirin DR tab 81 mg  81 mg Oral Daily    azaTHIOprine (Imuran) tab 50 mg  50 mg Oral Daily    metoprolol tartrate (Lopressor) tab 25 mg  25 mg Oral 2x Daily(Beta Blocker)    atorvastatin (Lipitor) tab 10 mg  10 mg Oral Nightly    tacrolimus (Prograf) cap 2 mg  2 mg Oral BID    glucose (Dex4) 15 GM/59ML oral liquid 15 g  15 g Oral Q15 Min PRN    Or    glucose (Glutose) 40% oral gel 15 g  15 g Oral Q15 Min PRN    Or    glucose-vitamin C (Dex-4) chewable tab 4 tablet  4 tablet Oral Q15 Min PRN    Or    dextrose 50% injection 50 mL  50 mL Intravenous Q15 Min PRN    Or    glucose (Dex4) 15 GM/59ML oral liquid 30 g  30 g Oral Q15 Min PRN    Or    glucose (Glutose) 40% oral gel 30 g  30 g Oral Q15 Min PRN    Or    glucose-vitamin C (Dex-4) chewable tab 8 tablet  8 tablet Oral Q15 Min PRN    sodium chloride 0.9% infusion   Intravenous Continuous    acetaminophen (Tylenol Extra Strength) tab 500 mg  500 mg Oral Q4H PRN    ondansetron (Zofran) 4 MG/2ML injection 4 mg  4 mg Intravenous Q6H PRN    prochlorperazine  (Compazine) 10 MG/2ML injection 5 mg  5 mg Intravenous Q8H PRN    morphINE PF 2 MG/ML injection 1 mg  1 mg Intravenous Q2H PRN    Or    morphINE PF 2 MG/ML injection 2 mg  2 mg Intravenous Q2H PRN    acetaminophen (Tylenol) tab 650 mg  650 mg Oral Q4H PRN    Or    HYDROcodone-acetaminophen (Norco) 5-325 MG per tab 1 tablet  1 tablet Oral Q4H PRN    Or    HYDROcodone-acetaminophen (Norco) 5-325 MG per tab 2 tablet  2 tablet Oral Q4H PRN    insulin aspart (NovoLOG) 100 Units/mL FlexPen 1-5 Units  1-5 Units Subcutaneous TID AC and HS

## 2025-04-27 NOTE — PROGRESS NOTES
[facility]  Progress Note    Anali Gauthier Patient Status:  Inpatient    10/2/1960 MRN [id number]   Location [facility] 5NW-A Attending Ish Mccauley MD   Hosp Day # 2 PCP Ashia Noyola MD     Subjective:  Patient seen lying in bed.  She feels better today.  Her prednisone was increased to 20 mg by rheumatology yesterday.  Her joint pain is much improved.  Her RVP is negative.    Objective:  Blood pressure 140/66, pulse 60, temperature 98.3 °F (36.8 °C), temperature source Oral, resp. rate 17, height 4' 11\" (1.499 m), weight 136 lb (61.7 kg), last menstrual period 2009, SpO2 94%, not currently breastfeeding.    General: lying in bed, NAD  HEENT: normocephalic   Neck: supple   Chest: Clear to auscultation.  Heart: Regular rate .   Abdomen: Soft, non tender   Extremities: mild swelling of hands  Neurological: AOx3  Skin: no rash    Labs:   Lab Results   Component Value Date    WBC 2.1 2025    HGB 10.4 2025    HCT 28.9 2025    .0 2025    CREATSERUM 2.04 2025    BUN 36 2025     2025    K 4.4 2025     2025    CO2 20.0 2025     2025    CA 9.6 2025    INR 1.41 2025    PTP 17.4 2025    PGLU 213 2025         Assessment and Plan:  Neutropenia  -  on admission  - now   - has chronic leukopenia with mostly lymphopenia likely from rheumatologic disease, antirejection meds  - Generally does not have significant neutropenia in the past  - Neutropenia most likely due to lupus flare versus viral illness.  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  Patient can be discharged from hematology standpoint.     Elda Hagan MD  [facility]  Department of Oncology and Hematology

## 2025-04-27 NOTE — PROGRESS NOTES
[facility] Hospitalist Progress Note                                                                     [facility]   part of [facility]      Anali Gauthier  10/2/1960    SUBJECTIVE:pt feeling better today but still with pain in her hands. No chest pain, palpitations, shortness of breath, cough, nausea, vomiting, abdominal pain.     OBJECTIVE:  Temp:  [98.3 °F (36.8 °C)-98.8 °F (37.1 °C)] 98.3 °F (36.8 °C)  Pulse:  [60-76] 62  Resp:  [17-18] 17  BP: (136-141)/(61-82) 137/62  SpO2:  [94 %-100 %] 100 %  Exam  Gen: No acute distress, alert and oriented   Pulm: Lungs clear bilaterally, normal respiratory effort, no crackles, no wheezing  CV: Heart with regular rate and rhythm, no murmur.   Abd: Abdomen soft, nontender, nondistended, bowel sounds present  MSK: No significant pitting edema or tenderness of the LE  Skin: no new rashes or lesions    Labs:   Recent Labs   Lab 04/25/25  1446 04/26/25  0659 04/27/25  0814   WBC 2.5* 2.0* 2.1*   HGB 13.0 10.8* 10.4*   MCV 95.0 95.0 93.8   .0 188.0 231.0   INR 1.84*  --  1.41*       Recent Labs   Lab 04/25/25  1446 04/26/25  0659 04/27/25  0814   * 136 136   K 4.0 4.3 4.4   CL 98 107 109   CO2 19.0* 19.0* 20.0*   BUN 21 28* 36*   CREATSERUM 2.10* 2.05* 2.04*   CA 10.0 9.6 9.6   MG  --  1.6  --    GLU 49* 142* 189*       Recent Labs   Lab 04/25/25  1446 04/26/25  0659   ALT <7* <7*   AST 16 12   ALB 4.3 3.5   LDH  --  123       Recent Labs   Lab 04/26/25 2033 04/26/25  2232 04/27/25  0250 04/27/25  0511 04/27/25  1157   PGLU 410* 293* 183* 213* 317*       Meds:   Scheduled: Scheduled Medications[1]  Continuous Infusions: Medication Infusions[2]  PRN: PRN Medications[3]    ASSESSMENT / PLAN:   64 year old female with history of coronary disease, kidney stones, type 2 diabetes, hypertension, hyperlipidemia, paroxysmal atrial fibrillation, history of a kidney transplant, history rheumatoid arthritis, history of lupus  presenting with hand swelling and pain.       Bilateral Hand Pain  -etiology uncertain  -possible flare of RA  -rheum followng --> increased steroids  -iv morphine prn  -norco po prn  -check esr and crp--> pending     Fever--> resolved  -etiology uncertain  -no evidence by history  -cxr neg  -ua pending--> eng  -blood cx pending--> ngtd  -check viral panel if febrile again  -ID following  -iv abx due to neutropenia      Neutropenia  -etiology uncertain  -Heme following     HTN  -sbp stable  -amlodipine  -metoprolol     HLD  -atorvastatin      Atrial fibrillation hx  -metoprolol  -eliquis     Type 2 DM  -hold home oral meds  -low dose insulin sliding scale     Lupus  RA  -prednisone  -azathioprine     Hx Kidney transplant  -tacrolimus  -prednisone  -renal following     Quality:  DVT Prophylaxis: scd, eliquis  CODE status: Full per chart, POA is Moon the pt sister  Amy: none     Plan of care discussed with patient, son and staff     Dispo: possible discharge     Ish Mccauley MD  [facility] Hospitalist  [phone]           [1]    insulin degludec  20 Units Subcutaneous Daily    predniSONE  20 mg Oral Daily    amLODIPine  10 mg Oral Daily    apixaban  5 mg Oral BID    aspirin  81 mg Oral Daily    azaTHIOprine  50 mg Oral Daily    metoprolol tartrate  25 mg Oral 2x Daily(Beta Blocker)    atorvastatin  10 mg Oral Nightly    tacrolimus  2 mg Oral BID    insulin aspart  1-5 Units Subcutaneous TID AC and HS   [2]    sodium chloride 50 mL/hr at 04/27/25 0254   [3]   diphenhydrAMINE    glucose **OR** glucose **OR** glucose-vitamin C **OR** dextrose **OR** glucose **OR** glucose **OR** glucose-vitamin C    acetaminophen    ondansetron    prochlorperazine    morphINE **OR** morphINE    acetaminophen **OR** HYDROcodone-acetaminophen **OR** HYDROcodone-acetaminophen

## 2025-04-27 NOTE — PROGRESS NOTES
Benadryl was given at 0334.  Patient  continues to deny difficulty in breathing.  She says the itching is gone now.

## 2025-04-27 NOTE — DISCHARGE INSTRUCTIONS
Follow up with primary care doctor in 1 week for repeat cbc with diff to make sure neutropenia resolved.

## 2025-04-27 NOTE — PROGRESS NOTES
NURSING DISCHARGE NOTE    Discharged Home via Ambulatory.  Accompanied by Family member  Belongings Taken by patient/family.    Discharge education provided. All questions and concerns were answered. All paperwork are given to pt. All consults cleared pt for dc. PIV removed. Tele and pulse ox returned. Discharge navigator completed.

## 2025-04-27 NOTE — PLAN OF CARE
Problem: Patient/Family Goals  Goal: Patient/Family Long Term Goal  Description: Patient's Long Term Goal: Discharge back to home  Interventions:- Follow POC- See additional Care Plan goals for specific interventions  4/27/2025 1200 by Julienne Payan RN  Outcome: Completed  4/27/2025 1019 by Julienne Payan RN  Outcome: Progressing  Goal: Patient/Family Short Term Goal  Description: Patient's Short Term Goal: 4/25 NOC:pain management  4/26AM: Rheum consult, pain management  4/27AM: pain management, control glucose, possible dc today  Interventions: - PRN pain meds- See additional Care Plan goals for specific interventions  4/27/2025 1200 by Julienne Payan RN  Outcome: Completed  4/27/2025 1019 by Julienne Payan RN  Outcome: Progressing     Problem: METABOLIC/FLUID AND ELECTROLYTES - ADULT  Goal: Glucose maintained within prescribed range  Description: INTERVENTIONS:- Monitor Blood Glucose as ordered- Assess for signs and symptoms of hyperglycemia and hypoglycemia- Administer ordered medications to maintain glucose within target range- Assess barriers to adequate nutritional intake and initiate nutrition consult as needed- Instruct patient on self management of diabetes  4/27/2025 1200 by Julienne Payan RN  Outcome: Completed  4/27/2025 1019 by Julienne Payan RN  Outcome: Progressing  Goal: Hemodynamic stability and optimal renal function maintained  Description: INTERVENTIONS:- Monitor labs and assess for signs and symptoms of volume excess or deficit- Monitor intake, output and patient weight- Monitor urine specific gravity, serum osmolarity and serum sodium as indicated or ordered- Monitor response to interventions for patient's volume status, including labs, urine output, blood pressure (other measures as available)- Encourage oral intake as appropriate- Instruct patient on fluid and nutrition restrictions as appropriate  4/27/2025 1200 by Julienne Payan RN  Outcome: Completed  4/27/2025 1019 by Julienne Payan  RN  Outcome: Progressing     Problem: HEMATOLOGIC - ADULT  Goal: Maintains hematologic stability  Description: INTERVENTIONS- Assess for signs and symptoms of bleeding or hemorrhage- Monitor labs and vital signs for trends- Administer supportive blood products/factors, fluids and medications as ordered and appropriate- Administer supportive blood products/factors as ordered and appropriate  4/27/2025 1200 by Julienne Payan RN  Outcome: Completed  4/27/2025 1019 by Julienne Payan, RN  Outcome: Progressing

## 2025-04-27 NOTE — PROGRESS NOTES
S: patient reports 100% improvement in joints  - no pain today    O:Blood pressure 140/66, pulse 60, temperature 98.3 °F (36.8 °C), temperature source Oral, resp. rate 17, height 4' 11\" (1.499 m), weight 136 lb (61.7 kg), last menstrual period 11/01/2009, SpO2 94%, not currently breastfeeding.  Musc:  - no synovitis, , left 3rd PIP large Norah node limited flexion  - other degenerative changes both hands    Lab:  Component      Latest Ref Rng 4/26/2025 4/27/2025   Glucose      70 - 99 mg/dL  189 (H)    Sodium      136 - 145 mmol/L  136    Potassium      3.5 - 5.1 mmol/L  4.4    Chloride      98 - 112 mmol/L  109    Carbon Dioxide, Total      21.0 - 32.0 mmol/L  20.0 (L)    ANION GAP      0 - 18 mmol/L  7    BUN      9 - 23 mg/dL  36 (H)    CREATININE      0.55 - 1.02 mg/dL  2.04 (H)    CALCIUM      8.7 - 10.6 mg/dL  9.6    CALCULATED OSMOLALITY      275 - 295 mOsm/kg  295    EGFR      >=60 mL/min/1.73m2  27 (L)    URIC ACID      3.1 - 7.8 mg/dL 5.6     RHEUMATOID FACTOR      <14.0 IU/mL 11.3        Legend:  (H) High  (L) Low    AP: inflammatory arthritis- resolved on prednisone 20 mg every day  - if home today , recommend outpatient tapering back to baseline of 5 mg every day  - ie 15 mg every day x5, then 10 mg every day x5, then maintain 5 mg every day    Ochoa Garcia MD

## 2025-04-29 NOTE — PAYOR COMM NOTE
--------------  ADMISSION REVIEW     Payor: [employer]   Subscriber #:  [id number]  Authorization Number: [id number]    Admit date: 4/25/25  Admit time:  7:58 PM       REVIEW DOCUMENTATION:     ED Provider Notes        ED Provider Notes signed by Baljit Almanzar MD at 4/25/2025  6:11 PM       Author: Baljit Almanzar MD Service: -- Author Type: Physician    Filed: 4/25/2025  6:11 PM Date of Service: 4/25/2025  2:35 PM Status: Signed    : Baljit Almanzar MD (Physician)           Patient Seen in: [facility] Emergency Department      History     Chief Complaint   Patient presents with    Fever    Joint Pain     Stated Complaint: swelling, body aches    Subjective:   HPI  Patient is a 64-year-old female presents emergency room with history of multiple complaints.  The patient has had bilateral hand swelling which has been ongoing for the last couple of days.  The patient does not have a history of lupus and states this pain feels very similar to what she has had previously when she gets fevers.  The patient has had a fever and a cough which has been ongoing the last couple of days.  The patient denies history of any recent travel.  The patient denies history of any ill contacts at home.  The patient denies history of any urinary complaints.  Patient also also complains of some pain to her right gluteal area.  The patient denies abdominal pain or chest pain.  Patient denies history of any other somatic complaints or discomfort at this time.    History of Present Illness               Objective:     Past Medical History:    Arrhythmia    Atherosclerosis of coronary artery    Calculus of kidney    ULISES I (cervical intraepithelial neoplasia I)    COLPOSCOPY    Diabetes (HCC)    Diabetes mellitus    due to prednisone    High blood pressure    High cholesterol    History of blood transfusion    History of oral surgery    HPV in female    HYPERTENSION    Incontinence    Inflammatory arthritis    Kidney  replaced by transplant ([facility])    DR. BANKS([place]),    LGSIL on Pap smear of cervix    LGSIL on Pap smear of cervix    Lupus erythematosus    Ovarian cyst    PAF (paroxysmal atrial fibrillation) ([facility])    PONV (postoperative nausea and vomiting)    RENAL DISEASE    kidney transplant    Renal disorder    Visual impairment    readers              Past Surgical History:   Procedure Laterality Date    Angioplasty (coronary)  2009    Xience Stent to LAD-Edward    Angioplasty (coronary)  2010    Xience Stent to Ramus-Edward    Av fistula revision, open      Cath drug eluting stent      Colonoscopy,diagnostic N/A 10/26/2015    Procedure: COLONOSCOPY, POSSIBLE BIOPSY, POSSIBLE POLYPECTOMY 60883;  Surgeon: Rory Lees MD;  [place] [facility] [place]    Colposcopy,bx cervix/endocerv curr  2018    Colposcopy,bx cervix/endocerv curr  2020    Kidney surgery            Other      kidney transplants x 3    Tubal ligation         Physical Exam     ED Triage Vitals [25 1341]   /83   Pulse 114   Resp 18   Temp (!) 101.9 °F (38.8 °C)   Temp src Oral   SpO2 94 %   O2 Device None (Room air)       Current Vitals:   Vital Signs  BP: 156/84  Pulse: 82  Resp: 18  Temp: 99.1 °F (37.3 °C)  Temp src: Oral  MAP (mmHg): (!) 104    Oxygen Therapy  SpO2: 99 %  O2 Device: None (Room air)        Physical Exam     Physical Exam         GENERAL: Well-developed, well-nourished female sitting up breathing easily in no apparent distress.  The patient is nontoxic in appearance.  HEENT: Head is normocephalic, atraumatic. Pupils are 4 mm equally round and reactive to light. Oropharynx is clear. Mucous membranes are moist.  NECK:  No meningeal signs or nuchal rigidity appreciated. No stridor.  LUNGS: Clear at the apices with diminished breath sounds at both bases.  HEART: Regular rhythm with a tachycardic rate. Normal S1, S2 no S3, or S4. No murmur.  ABDOMEN: There is no focal tenderness to palpation  appreciated anywhere throughout the abdomen. There is no guarding, no rebound, no mass, and no organomegaly appreciated. There is normoactive bowel sounds. There is no hernia.  BACK: There is no focal tenderness to palpation throughout the thoracic or lumbar spinous processes.  There is no evidence of any abscess or erythema noted to the gluteal area bilaterally.  Exam done with female chaperone at the bedside.  EXTREMITIES: There is no cyanosis, clubbing, or edema appreciated. Pulses are 2+ and equal in all 4 extremities.  There is mild erythema and tenderness to the PIP joints and MCP joints of both right hand and left hand.  There is no other focal tenderness to either upper or lower extremities appreciated.  NEURO: Patient is awake, alert and oriented to time place and person. Motor strength is 5 over 5 in all 4 extremities. There are no gross motor or sensory deficits appreciated. Cranial nerves II through XII are intact.  Patient answering all questions appropriately          ED Course     Labs Reviewed   COMP METABOLIC PANEL (14) - Abnormal; Notable for the following components:       Result Value    Glucose 49 (*)     Sodium 130 (*)     CO2 19.0 (*)     Creatinine 2.10 (*)     Calculated Osmolality 270 (*)     eGFR-Cr 26 (*)     ALT <7 (*)     All other components within normal limits   CBC WITH DIFFERENTIAL WITH PLATELET - Abnormal; Notable for the following components:    WBC 2.5 (*)     Neutrophil Absolute Prelim 0.34 (*)     Neutrophil Absolute 0.34 (*)     Lymphocyte Absolute 0.87 (*)     Monocyte Absolute 1.22 (*)     All other components within normal limits   PROTHROMBIN TIME (PT) - Abnormal; Notable for the following components:    PT 21.4 (*)     INR 1.84 (*)     All other components within normal limits   PTT, ACTIVATED - Abnormal; Notable for the following components:    PTT 42.1 (*)     All other components within normal limits   RBC MORPHOLOGY SCAN - Abnormal; Notable for the following  components:    Clumped Platelets 1+ (*)     Giant platelets Few (*)     All other components within normal limits   PROCALCITONIN - Abnormal; Notable for the following components:    Procalcitonin 2.90 (*)     All other components within normal limits   POCT GLUCOSE - Abnormal; Notable for the following components:    POC Glucose 189 (*)     All other components within normal limits   LACTIC ACID, PLASMA - Normal   SARS-COV-2/FLU A AND B/RSV BY PCR (GENEXPERT) - Normal    Narrative:     This test is intended for the qualitative detection and differentiation of SARS-CoV-2, influenza A, influenza B, and respiratory syncytial virus (RSV) viral RNA in nasopharyngeal or nares swabs from individuals suspected of respiratory viral infection consistent with COVID-19 by their healthcare provider. Signs and symptoms of respiratory viral infection due to SARS-CoV-2, influenza, and RSV can be similar.    Test performed using the Xpert Xpress SARS-CoV-2/FLU/RSV (real time RT-PCR)  assay on the GeneXpert instrument, [employer], [place] [address].   This test is being used under the Food and Drug Administration's Emergency Use Authorization.    The authorized Fact Sheet for Healthcare Providers for this assay is available upon request from the laboratory.   SCAN SLIDE   URINALYSIS WITH CULTURE REFLEX   RAINBOW DRAW LAVENDER   RAINBOW DRAW LIGHT GREEN   RAINBOW DRAW BLUE   BLOOD CULTURE   BLOOD CULTURE          Results     I personally reviewed the patient's chest x-ray images and my individual interpretation shows no evidence of any acute infiltrate.  I also reviewed the official radiology report which showed results as noted below.      XR CHEST AP PORTABLE  (CPT=71045)  Result Date: 4/25/2025  CONCLUSION:  Borderline heart size. No active disease seen.   LOCATION:  [facility]      Dictated by (CST): Todd Severino MD on 4/25/2025 at 3:12 PM     Finalized by (CST): Todd Severino MD on 4/25/2025 at 3:12 PM                            MDM      Patient had an IV line established blood work drawn including a CBC, chemistries, BUN/creatinine, and blood sugar which showed evidence of white blood count of 2.5 with an ANC of 340.  Patient's coags are elevated as well with a pro time of 21.4 PTT 42.1 INR 1.84.  The patient's creatinine is 2.1 which is as per baseline.  Patient does have a history of a previous renal transplant.  Patient's lactic acid is normal.  The patient's Pro-Bladimir is elevated 2.9.  The patient could not give a urine specimen here in the ER.  Patient's RSV, COVID, and flu are all negative.  Patient placed on continuous pulse ox and cardiac monitor blood cultures were obtained here in the ER.  The patient could not give a urine specimen here in the ER.  The patient was given IV fluid was given a stress dose of IV steroids at 1 point had a blood sugar of 49 and was given an amp of D50 with improvement of patient's blood sugar after this was given.  Patient admits she has not been eating and drinking well over the last several days.  The patient will be admitted to the telemetry floor for further care.  The patient's case discussed with the [facility] hospitalist who came and saw the patient in the emergency room as well as [facility] infectious disease we did want the patient to be given empiric 2 g of cefepime at this time.  The patient will be admitted to the hospital for further care at this time.  Patient admitted with no further new complaints.  Admission disposition: 4/25/2025  5:35 PM           Medical Decision Making      Disposition and Plan     Clinical Impression:  1. Neutropenic fever    2. Hypoglycemia           Supplementary Documentation:         Hospital Problems       Present on Admission  Date Reviewed: 10/31/2024          ICD-10-CM Noted POA    * (Principal) Neutropenic fever D70.9, R50.81 4/25/2025 Unknown              Signed by Baljit Almanzar MD on 4/25/2025  6:11 PM       4/25 H & P  Chief Complaint: Hand swelling and  pain      History of Present Illness: Anali Gauthier is a 64 year old female with history of coronary disease, kidney stones, type 2 diabetes, hypertension, hyperlipidemia, paroxysmal atrial fibrillation, history of a kidney transplant, history rheumatoid arthritis, history of lupus presenting with hand swelling and pain.  Patient says on Wednesday she felt a little chilled at work.  Thursday she was feeling generalized weakness and for the most part was unable to move around much around in her house.  She noticed as well her hands were swollen she had some right gluteal discomfort.  The symptoms continued into today where the pain in her hands were unbearable.  As result she came to the emergency room for further ration treatment.  She said similar symptoms like this occurred earlier this year at which point she was treated and discharged from the ER for a viral infection.  Patient otherwise denies any significant positive review of systems at this point.  Patient did have a low-grade fever at home of 99.  But was found to have a temperature of near 102 in the ER.     Past Medical History:[Past Medical History]     [Past Medical History]   Arrhythmia    Atherosclerosis of coronary artery    Calculus of kidney    ULISES I (cervical intraepithelial neoplasia I)     COLPOSCOPY    Diabetes ([facility])    Diabetes mellitus     due to prednisone    High blood pressure    High cholesterol    History of blood transfusion    History of oral surgery    HPV in female    HYPERTENSION    Incontinence    Inflammatory arthritis    Kidney replaced by transplant ([facility])     DR. BANKS([place]),    LGSIL on Pap smear of cervix    LGSIL on Pap smear of cervix    Lupus erythematosus    Ovarian cyst    PAF (paroxysmal atrial fibrillation) ([facility])    PONV (postoperative nausea and vomiting)    RENAL DISEASE     kidney transplant    Renal disorder    Visual impairment     readers     Lupus            Vitals (last day) before discharge       Date/Time  Temp Pulse Resp BP SpO2 Weight O2 Device O2 Flow Rate (L/min) [facility]    04/27/25 1200 98.3 °F (36.8 °C) 62 17 137/62 -- -- None (Room air) --     04/27/25 0945 -- 64 -- -- 100 % -- -- --     04/27/25 0800 98.3 °F (36.8 °C) 60 17 140/66 -- -- None (Room air) --     04/27/25 0500 98.8 °F (37.1 °C) 62 18 141/82 94 % -- None (Room air) --     04/26/25 2300 98.5 °F (36.9 °C) 62 18 136/67 99 % -- None (Room air) --     04/26/25 2040 98.8 °F (37.1 °C) 76 18 137/61 99 % -- None (Room air) --     04/26/25 1145 98.3 °F (36.8 °C) 64 19 137/55 100 % -- None (Room air) --     04/26/25 0836 98 °F (36.7 °C) 63 18 148/79 99 % -- None (Room air) --     04/26/25 0540 98.2 °F (36.8 °C) 62 18 126/62 -- -- None (Room air) -- CARMELO         Physical Exam:    /84   Pulse 82   Temp 99.1 °F (37.3 °C) (Oral)   Resp 18   Ht 4' 11\" (1.499 m)   Wt 136 lb (61.7 kg)   LMP 11/01/2009   SpO2 99%   BMI 27.47 kg/m²   General: No acute distress. Alert and oriented x 3.  HEENT: Normocephalic atraumatic. Moist mucous membranes. EOM-I  Neck: No JVD. No carotid bruits.  Respiratory: Clear to auscultation bilaterally. No wheezes. No crackles  Cardiovascular: S1, S2. Regular rate and rhythm. No murmurs  Chest and Back: No tenderness or deformity.  Abdomen: Soft, nontender, nondistended.  Positive bowel sounds. No rebound, guarding  Neurologic: No focal neurological deficits. CNII-XII grossly intact. Sensation and strength intact  Musculoskeletal: Moves all extremities.  Extremities: Bilateral hands with non pitting swelling. Tender to palpation, No tenderness of the LE  Integument: No new rashes or lesions.   Psychiatric: Appropriate mood and affect.        Diagnostic Data:       Labs:      Recent Labs   Lab 04/25/25  1446   WBC 2.5*   HGB 13.0   MCV 95.0   .0   INR 1.84*             Recent Labs   Lab 04/25/25  1446   GLU 49*   BUN 21   CREATSERUM 2.10*   CA 10.0   ALB 4.3   *   K 4.0   CL 98   CO2 19.0*   ALKPHO 97   AST  16   ALT <7*   BILT 1.1   TP 7.1         Estimated Creatinine Clearance: 26.4 mL/min (A) (based on SCr of 2.1 mg/dL (H)).         Recent Labs   Lab 04/25/25  1446   PTP 21.4*   INR 1.84*         No results for input(s): \"TROP\", \"CK\" in the last 168 hours.     Imaging: Imaging data reviewed in Epic.        ASSESSMENT / PLAN:   64 year old female with history of coronary disease, kidney stones, type 2 diabetes, hypertension, hyperlipidemia, paroxysmal atrial fibrillation, history of a kidney transplant, history rheumatoid arthritis, history of lupus presenting with hand swelling and pain.       Bilateral Hand Pain  -etiology uncertain  -possible flare of RA  -consult Rheum  -iv morphine prn  -norco po prn  -check esr and crp     Fever  -etiology uncertain  -no evidence by history  -cxr neg  -ua pending  -blood cx pending  -check viral panel if febrile again  -ID consulted  -iv abx due to neutropenia      Neutropenia  -etiology uncertain  -Heme consulted     HTN  -sbp stable  -amlodipine  -metoprolol     HLD  -atorvastatin      Atrial fibrillation hx  -metoprolol  -eliquis     Type 2 DM  -hold home oral meds  -low dose insulin sliding scale     Lupus  RA  -prednisone  -azathioprine     Hx Kidney transplant  -tacrolimus  -prednisone  -renal consult     Quality:  DVT Prophylaxis: scd, eliquis  CODE status: Full per chart, POA is Moon the pt sister  Amy: none     Plan of care discussed with patient, son and staff     Dispo: no discharge     MD Melissa Hernandez Hospitalist  [phone]     4/26   Rheumatology    SUBJECTIVE:     Reason for Consultation: inflammatory arthritis     History of Present Illness: Patient is a 64 year old female with new onset inflammatory arthritis in both hands day after onset of febrile upper respiratory infection  - no rashes  - also had right hip pain, with difficulty walking but this is improved  - history of lupus nephritis and s/p renal transplant on tacrolimus, azathioprine and  prednisone 5 mg qd  - chronic leukopenia   - chronic atrial fibrillation  - stable renal function  - locations include       Review of Systems:  General, HEENT, Respiratory, cardiovascular, gastrointestinal, vascular, dermatologic, musculoskeletal, neurologic system review was performed and pertinent positives per HPI, otherwise negative ROS     [Past Medical History]    [Past Medical History]   Arrhythmia    Atherosclerosis of coronary artery    Calculus of kidney    ULISES I (cervical intraepithelial neoplasia I)     COLPOSCOPY    Diabetes ([facility])    Diabetes mellitus     due to prednisone    High blood pressure    High cholesterol    History of blood transfusion    History of oral surgery    HPV in female    HYPERTENSION    Incontinence    Inflammatory arthritis    Kidney replaced by transplant ([facility])     DR. BANKS([place]),    LGSIL on Pap smear of cervix    LGSIL on Pap smear of cervix    Lupus erythematosus    Ovarian cyst    PAF (paroxysmal atrial fibrillation) ([facility])    PONV (postoperative nausea and vomiting)    RENAL DISEASE     kidney transplant    Renal disorder    Visual impairment     readers      [Past Surgical History]    [Past Surgical History]        Procedure Laterality Date    Angioplasty (coronary)   2009     Xience Stent to LAD-Edward    Angioplasty (coronary)   2010     Xience Stent to Ramus-Edward    Av fistula revision, open        Cath drug eluting stent        Colonoscopy,diagnostic N/A 10/26/2015     Procedure: COLONOSCOPY, POSSIBLE BIOPSY, POSSIBLE POLYPECTOMY 41233;  Surgeon: Rory Lees MD;  [place] [facility]    Colposcopy,bx cervix/endocerv curr   2018    Colposcopy,bx cervix/endocerv curr   2020    Kidney surgery                Other         kidney transplants x 3    Tubal ligation          Allergy: [Allergies]    [Allergies]        Allergen Reactions    Tetracycline Base RASH    Toujeo Max Solostar [Lantus] NAUSEA ONLY and PAIN        Headache      [Medications - Current]    [Medications - Current]  No current outpatient medications on file.   Social History: [Short Social Hx on File]    [Short Social Hx on File]  Social History        Socioeconomic History    Marital status:     Number of children: 1   Occupational History    Occupation: [employer]   Tobacco Use    Smoking status: Former       Current packs/day: 0.50       Average packs/day: 0.5 packs/day for 9.2 years (4.6 ttl pk-yrs)       Types: Cigarettes       Start date: 1/30/2016    Smokeless tobacco: Never    Tobacco comments:       cigarettes   Vaping Use    Vaping status: Never Used   Substance and Sexual Activity    Alcohol use: Not Currently    Drug use: No    Sexual activity: Not Currently       Partners: Male      Social Drivers of Health           Food Insecurity: No Food Insecurity (4/25/2025)     NCSS - Food Insecurity      Worried About Running Out of Food in the Last Year: No      Ran Out of Food in the Last Year: No   Transportation Needs: No Transportation Needs (4/25/2025)     NCSS - Transportation      Lack of Transportation: No   Housing Stability: Not At Risk (4/25/2025)     NCSS - Housing/Utilities      Has Housing: Yes      Worried About Losing Housing: No      Unable to Get Utilities: No      Family History:  [Family History]    [Family History]        Problem Relation Age of Onset    Diabetes Mother              EXAM:   /79 (BP Location: Left arm)   Pulse 63   Temp 98 °F (36.7 °C) (Oral)   Resp 18   Ht 4' 11\" (1.499 m)   Wt 136 lb (61.7 kg)   LMP 11/01/2009   SpO2 99%   BMI 27.47 kg/m²      Gen- well nourished, alert, no distress  Musc- Shoulders- normal ROM, S0T0 bilaterally  Elbows- normal ROM, S0T0 bilaterally  Wrists- normal ROM, S0T0 bilaterally  MCPs- S1T1 2-5th, no deformity bilaterally  PIPs- S1T1 2-5th,  no deformity bilaterally  Hips- normal ROM, no tenderness bilaterally  Knees- normal ROM, S0T0 bilaterally  Ankles-normal ROM,S0T0  bilaterally  Feet-  no swelling, no tenderness, no deformity bilaterally  Fibromyalgia tender points- 0/18        LABS:     Component      Latest Ref Rng 4/25/2025 4/26/2025   WBC      4.0 - 11.0 x10(3) uL   2.0 (L)    RBC      3.80 - 5.30 x10(6)uL   3.20 (L)    Hemoglobin      12.0 - 16.0 g/dL   10.8 (L)    Hematocrit      35.0 - 48.0 %   30.4 (L)    Platelet Count      150.0 - 450.0 10(3)uL   188.0    MCV      80.0 - 100.0 fL   95.0    MCH      26.0 - 34.0 pg   33.8    MCHC      31.0 - 37.0 g/dL   35.5    RDW      %   12.7    Prelim Neutrophil Abs      1.50 - 7.70 x10 (3) uL   0.64 (L)    Neutrophils Absolute      1.50 - 7.70 x10(3) uL   0.64 (L)    Lymphocytes Absolute      1.00 - 4.00 x10(3) uL   0.50 (L)    Monocytes Absolute      0.10 - 1.00 x10(3) uL   0.83    Eosinophils Absolute      0.00 - 0.70 x10(3) uL   0.00    Basophils Absolute      0.00 - 0.20 x10(3) uL   0.01    Immature Granulocyte Absolute      0.00 - 1.00 x10(3) uL   0.01    Neutrophils %      %   32.2    Lymphocytes %      %   25.1    Monocytes %      %   41.7    Eosinophils %      %   0.0    Basophils %      %   0.5    Immature Granulocyte %      %   0.5    Glucose      70 - 99 mg/dL   142 (H)    Sodium      136 - 145 mmol/L   136    Potassium      3.5 - 5.1 mmol/L   4.3    Chloride      98 - 112 mmol/L   107    Carbon Dioxide, Total      21.0 - 32.0 mmol/L   19.0 (L)    ANION GAP      0 - 18 mmol/L   10    BUN      9 - 23 mg/dL   28 (H)    CREATININE      0.55 - 1.02 mg/dL   2.05 (H)    CALCIUM      8.7 - 10.6 mg/dL   9.6    CALCULATED OSMOLALITY      275 - 295 mOsm/kg   290    EGFR      >=60 mL/min/1.73m2   27 (L)    AST (SGOT)      <34 U/L   12    ALT (SGPT)      10 - 49 U/L   <7 (L)    ALKALINE PHOSPHATASE      50 - 130 U/L   72    Total Bilirubin      0.2 - 1.1 mg/dL   0.7    PROTEIN, TOTAL      5.7 - 8.2 g/dL   6.0    Albumin      3.2 - 4.8 g/dL   3.5    Globulin      2.0 - 3.5 g/dL   2.5    A/G Ratio      1.0 - 2.0    1.4    C-REACTIVE  PROTEIN      <=0.50 mg/dL 27.10 (H)      SED RATE      0 - 30 mm/Hr 71 (H)      URIC ACID      3.1 - 7.8 mg/dL   5.6       Legend:  (H) High  (L) Low        ASSESSMENT AND PLAN:   1 inflammatory arthritis-   Onset with respiratory infection with fevers and cough, suspect viral  - viral induced arthritis most likely  - unlikely to be related to underlying lupus on chronic immunosuppression  - advise increase in prednisone acutely to manage hopefully self limited joint inflammtion  - case discussed with Dr. Lyon today  Total time spent on current patient encounter was  45  minutes, including pre-visit record review, performing medically appropriate exam, ordering medications and tests, documentation, and independent review of test results.        Ochoa Garcia MD  Rheumatology  Office: [phone]  FAX: [phone]                  Electronically signed by Ochoa Garcia MD at 4/26/2025 10:42 AM

## 2025-04-29 NOTE — PAYOR COMM NOTE
--------------  DISCHARGE REVIEW    Payor: [employer]   Subscriber #:  [id number]  Authorization Number: [id number]    Admit date: 4/25/25  Admit time:   7:58 PM  Discharge Date: 4/27/2025  2:04 PM     Admitting Physician: Jennifer Lowry MD  Attending Physician:  No att. providers found  Primary Care Physician: Ashia Noyola MD       4/26 ID    Date of Admission:  4/25/2025  Date of Consult:  4/26/2025     Reason for Consultation:  Fever, neutropenia     History of Present Illness:  Anali Gauthier is a a(n) 64 year old female being seen at your request regarding fevers with neutropenia.  Patient is immunocompromised with a h/o RA and lupus and she presented to the ED with hand swelling, pain, and fevers.  Patient reports a similar presentation earlier this year when she had a viral infection.  R hip pain as well.  Temp noted to be 102F in the ED.  Because of some associated neutropenia, empiric cefepime was started.  Patient has now been evaluated by additional specialists.  Neutropenia is acute on chronic.       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       Patient is currently on IV cefepime empirically.  PCT mildly elevated but in the setting of some ANA as well.  We are asked to see and assist.     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 BANKS([place]),    LGSIL on Pap smear of cervix    LGSIL on Pap smear of cervix    Lupus erythematosus    Ovarian cyst    PAF (paroxysmal atrial fibrillation)  (HCC)    PONV (postoperative nausea and vomiting)    RENAL DISEASE     kidney transplant    Renal disorder    Visual impairment     readers     [Past Surgical History]    [Past Surgical History]        Procedure Laterality Date    Angioplasty (coronary)   2009     Xience Stent to LAD-Edward    Angioplasty (coronary)   2010     Xience Stent to Ramus-Edward    Av fistula revision, open        Cath drug eluting stent        Colonoscopy,diagnostic N/A 10/26/2015     Procedure: COLONOSCOPY, POSSIBLE BIOPSY, POSSIBLE POLYPECTOMY 91878;  Surgeon: Rory Lees MD;  Location: [facility], [facility]    Colposcopy,bx cervix/endocerv curr   2018    Colposcopy,bx cervix/endocerv curr   2020    Kidney surgery                Other         kidney transplants x 3    Tubal ligation         [Family History]    [Family History]        Problem Relation Age of Onset    Diabetes Mother        reports that she has quit smoking. Her smoking use included cigarettes. She started smoking about 9 years ago. She has a 4.6 pack-year smoking history. She has never used smokeless tobacco. She reports that she does not currently use alcohol. She reports that she does not use drugs.     Allergies:  [Allergies]    [Allergies]        Allergen Reactions    Tetracycline Base RASH    Toujeo Max Solostar [Lantus] NAUSEA ONLY and PAIN       Headache        Medications:  [Current Hospital Medications]    [Current Hospital Medications]     Current Facility-Administered Medications:     [START ON 2025] predniSONE (Deltasone) tab 20 mg, 20 mg, Oral, Daily    amLODIPine (Norvasc) tab 10 mg, 10 mg, Oral, Daily    apixaban (Eliquis) tab 5 mg, 5 mg, Oral, BID    aspirin DR tab 81 mg, 81 mg, Oral, Daily    azaTHIOprine (Imuran) tab 50 mg, 50 mg, Oral, Daily    metoprolol tartrate (Lopressor) tab 25 mg, 25 mg, Oral, 2x Daily(Beta Blocker)    atorvastatin (Lipitor) tab 10 mg, 10 mg, Oral, Nightly    tacrolimus (Prograf) cap 2  mg, 2 mg, Oral, BID    glucose (Dex4) 15 GM/59ML oral liquid 15 g, 15 g, Oral, Q15 Min PRN **OR** glucose (Glutose) 40% oral gel 15 g, 15 g, Oral, Q15 Min PRN **OR** glucose-vitamin C (Dex-4) chewable tab 4 tablet, 4 tablet, Oral, Q15 Min PRN **OR** dextrose 50% injection 50 mL, 50 mL, Intravenous, Q15 Min PRN **OR** glucose (Dex4) 15 GM/59ML oral liquid 30 g, 30 g, Oral, Q15 Min PRN **OR** glucose (Glutose) 40% oral gel 30 g, 30 g, Oral, Q15 Min PRN **OR** glucose-vitamin C (Dex-4) chewable tab 8 tablet, 8 tablet, Oral, Q15 Min PRN    sodium chloride 0.9% infusion, , Intravenous, Continuous    acetaminophen (Tylenol Extra Strength) tab 500 mg, 500 mg, Oral, Q4H PRN    ondansetron (Zofran) 4 MG/2ML injection 4 mg, 4 mg, Intravenous, Q6H PRN    prochlorperazine (Compazine) 10 MG/2ML injection 5 mg, 5 mg, Intravenous, Q8H PRN    morphINE PF 2 MG/ML injection 1 mg, 1 mg, Intravenous, Q2H PRN **OR** morphINE PF 2 MG/ML injection 2 mg, 2 mg, Intravenous, Q2H PRN    acetaminophen (Tylenol) tab 650 mg, 650 mg, Oral, Q4H PRN **OR** HYDROcodone-acetaminophen (Norco) 5-325 MG per tab 1 tablet, 1 tablet, Oral, Q4H PRN **OR** HYDROcodone-acetaminophen (Norco) 5-325 MG per tab 2 tablet, 2 tablet, Oral, Q4H PRN    insulin aspart (NovoLOG) 100 Units/mL FlexPen 1-5 Units, 1-5 Units, Subcutaneous, TID AC and HS    ceFEPIme (Maxipime) 1 g in sodium chloride 0.9% 100mL IVPB-ZOEY, 1 g, Intravenous, Q24H     Review of Systems:                 Constitutional:  Fevers.                HEENT:  No visual changes, oral ulcers, sore throat, difficulty swallowing.                Respiratory: Negative for cough, sputum, hemoptysis, chest pain, wheezing, dyspnea on exertion, or stridor.                Cardiovascular: Negative for chest pain, palpitations, irregular heart beats.                Gastrointestinal:  No abdominal pain, nausea, vomiting, diarrhea, or constipation.                Genitourinary:  No dysuria, hematuria, urine urgency or  frequency.                Integument/breast: Negative for rash, skin lesions, and pruritus.                Hematologic/lymphatic: Negative for easy bruising, bleeding, and lymphadenopathy.                Musculoskeletal: Hand pain, R hip pain.                Neurological: No focal neurologic deficits, seizures, tremors.                Psych:  No h/o anxiety, depression, other psych d/o.                Endocrine: No history of of diabetes, thyroid disorder.     Remainder of 12 point review of systems otherwise negative.     Vital signs in last 24 hours:  Patient Vitals for the past 24 hrs:    BP Temp Temp src Pulse Resp SpO2 Weight   04/26/25 1145 137/55 98.3 °F (36.8 °C) Oral 64 19 100 % --   04/26/25 0836 148/79 98 °F (36.7 °C) Oral 63 18 99 % --   04/26/25 0540 126/62 98.2 °F (36.8 °C) Oral 62 18 -- --   04/25/25 2329 -- -- -- -- -- -- 136 lb (61.7 kg)   04/25/25 2021 144/74 99 °F (37.2 °C) Oral 75 18 97 % --   04/25/25 1815 -- -- -- 99 16 100 % --   04/25/25 1730 156/84 -- -- 82 18 99 % --   04/25/25 1728 -- 99.1 °F (37.3 °C) Oral -- -- -- --   04/25/25 1700 147/82 -- -- 82 19 100 % --   04/25/25 1645 156/79 -- -- 88 17 100 % --   04/25/25 1630 145/79 -- -- 91 17 100 % --   04/25/25 1545 158/76 -- -- 92 18 99 % --   04/25/25 1500 -- -- -- 99 26 98 % --         Intake/Output:  I/O this shift:  In: 240 [P.O.:240]  Out: -      Physical Exam:                General: Awake, alert, non-tox and in NAD.                Head: Normocephalic, without obvious abnormality, atraumatic.                Eyes: Conjunctivae/corneas clear.  No scleral icterus.  No conjunctival                      hemorrhage.                Nose: Nares normal.                Throat:  Oropharynx clear, MMs moist.                Neck: Trachea ML, no masses.                Lungs: CTA b/l no rhonchi, rales, wheezes.                Chest wall: No tenderness or deformity.                Heart: Regular rate and rhythm, normal S1S2, no murmurs.                 Abdomen: Soft, NT/ND.  Bowel sounds present.  No organomegaly.                Extremity: No edema.                Skin: No rashes or lesions.                Neurological: No focal neurologic deficits.     Lab Data Review:        Lab Results   Component Value Date     WBC 2.0 04/26/2025     HGB 10.8 04/26/2025     HCT 30.4 04/26/2025     .0 04/26/2025     CREATSERUM 2.05 04/26/2025     BUN 28 04/26/2025      04/26/2025     K 4.3 04/26/2025      04/26/2025     CO2 19.0 04/26/2025      04/26/2025     CA 9.6 04/26/2025     ALB 3.5 04/26/2025     ALKPHO 72 04/26/2025     BILT 0.7 04/26/2025     TP 6.0 04/26/2025     AST 12 04/26/2025     ALT <7 04/26/2025     MG 1.6 04/26/2025     B12 478 04/26/2025         Cultures:   Blood cultures negative  Flu/COVID/RSV negative    Radiology:  FINDINGS:  The heart is borderline in size.  The lungs are clear of acute-appearing disease process.  The costophrenic angles are sharp. There is no active disease seen on the basis of portable radiography.      Assessment and Plan:     Fevers and acute on chronic neutropenia in this immunosuppressed patient  - Suspect a component of ?post-viral reactive arthritis vs. Flare in arthritis/lupus alone  - Blood cultures negative  - Flu/COVID/RSV negative, will check full RVP  - Urine with minimal sediment  - IV cefepime started empirically     2.  Immunosuppressed patient with a h/o RA/lupus  - Agree with stress dose steroids as Rx     3.  Disposition - inpatient.  Hands and hip feeling better with steroid burst.  F/u all pending cultures.  Trending temps and WBCs.  Supportive care ongoing.  Continue empiric IV cefepime for now but hope to streamline quickly as fevers resolve if all cultures remain negative.  D/w patient.  She is hoping to go home tomorrow.     Barbie Silva DO[facility]  [phone]     4/26/2025  2:52 PM

## (undated) DIAGNOSIS — Z94.0 STATUS POST KIDNEY TRANSPLANT: Primary | ICD-10-CM

## (undated) DEVICE — CYSTO PACK: Brand: MEDLINE INDUSTRIES, INC.

## (undated) DEVICE — GAMMEX® PI HYBRID SIZE 6, STERILE POWDER-FREE SURGICAL GLOVE, POLYISOPRENE AND NEOPRENE BLEND: Brand: GAMMEX

## (undated) DEVICE — 3M™ RED DOT™ MONITORING ELECTRODE WITH FOAM TAPE AND STICKY GEL, 50/BAG, 20/CASE, 72/PLT 2570: Brand: RED DOT™

## (undated) DEVICE — 1200CC GUARDIAN II: Brand: GUARDIAN

## (undated) DEVICE — ENDOSCOPY PACK UPPER: Brand: MEDLINE INDUSTRIES, INC.

## (undated) DEVICE — ELECTRODE ES RET 2 PATE W/ 10FT CRD MPLR DISP

## (undated) DEVICE — Device: Brand: DEFENDO AIR/WATER/SUCTION AND BIOPSY VALVE

## (undated) DEVICE — 20 ML SYRINGE LUER-LOCK TIP: Brand: MONOJECT

## (undated) DEVICE — SLEEVE COMPR MD KNEE LEN SGL USE KENDALL SCD

## (undated) DEVICE — FILTERLINE NASAL ADULT O2/CO2

## (undated) DEVICE — TRAY SKIN PREP PVP-1

## (undated) DEVICE — SOLUTION IV 1000ML DIL ST H2O

## (undated) NOTE — ED AVS SNAPSHOT
Alisha Roberson   MRN: [id number]    Department:  [facility] Emergency Department   Date of Visit:  7/25/2019           Disclosure     Insurance plans vary and the physician(s) referred by the ER may not be covered by your plan.  Please contact your i tell this physician (or your personal doctor if your instructions are to return to your personal doctor) about any new or lasting problems. The primary care or specialist physician will see patients referred from the [facility] Emergency Department.  Meme Pa

## (undated) NOTE — LETTER
[facility]  Taylor Cooney [id number] [facility], [address] [place] [address]    Consent for Operation    Date: __________________    Time: _______________    1. I authorize the performance upon [facility] the following operation:    esophagogastroduodenoscopy    2.  I aut videotape. [facility] will not be responsible for storage or maintenance of this tape. 6. For the purpose of advancing medical education, I consent to the admittance of observers to the Operating Room.     7. I authorize the use of any specimen, organs Signature of Patient:   ___________________________    When the patient is a minor or mentally incompetent to give consent:  Signature of person authorized to consent for patient: ___________________________   Relationship to patient: _____________________ these medicines may increase my risk of anesthetic complications. · If I am allergic to anything or have had a reaction to anesthesia before. 3. I understand how the anesthesia medicine will help me (benefits).     4. I understand that with any type of patient’s representative) and answered their questions. The patient or their representative has agreed to have anesthesia services.     _____________________________________________________________________________  Witness        Date   Time  I have montserrat

## (undated) NOTE — ED AVS SNAPSHOT
Bharat Ochoa   MRN: [id number]    Department:  [facility] Emergency Department   Date of Visit:  10/9/2019           Disclosure     Insurance plans vary and the physician(s) referred by the ER may not be covered by your plan.  Please contact your i tell this physician (or your personal doctor if your instructions are to return to your personal doctor) about any new or lasting problems. The primary care or specialist physician will see patients referred from the [facility] Emergency Department.  Salvadore Skiff